# Patient Record
Sex: MALE | Race: BLACK OR AFRICAN AMERICAN | NOT HISPANIC OR LATINO | Employment: OTHER | URBAN - METROPOLITAN AREA
[De-identification: names, ages, dates, MRNs, and addresses within clinical notes are randomized per-mention and may not be internally consistent; named-entity substitution may affect disease eponyms.]

---

## 2017-04-04 ENCOUNTER — HOSPITAL ENCOUNTER (EMERGENCY)
Facility: HOSPITAL | Age: 57
Discharge: HOME | End: 2017-04-04
Attending: EMERGENCY MEDICINE
Payer: MEDICAID

## 2017-04-04 VITALS
RESPIRATION RATE: 16 BRPM | HEIGHT: 65 IN | BODY MASS INDEX: 23.32 KG/M2 | OXYGEN SATURATION: 98 % | TEMPERATURE: 98 F | DIASTOLIC BLOOD PRESSURE: 77 MMHG | HEART RATE: 88 BPM | SYSTOLIC BLOOD PRESSURE: 134 MMHG | WEIGHT: 140 LBS
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
## 2017-04-04 DIAGNOSIS — M25.562 ACUTE PAIN OF LEFT KNEE: Primary | ICD-10-CM

## 2017-04-04 DIAGNOSIS — S70.01XA CONTUSION OF RIGHT HIP, INITIAL ENCOUNTER: ICD-10-CM

## 2017-04-04 DIAGNOSIS — S09.90XA HEAD INJURY: ICD-10-CM

## 2017-04-04 PROCEDURE — 99284 EMERGENCY DEPT VISIT MOD MDM: CPT

## 2017-04-04 PROCEDURE — 25000003 PHARM REV CODE 250: Performed by: NURSE PRACTITIONER

## 2017-04-04 RX ORDER — ACETAMINOPHEN 500 MG
500 TABLET ORAL
Status: COMPLETED | OUTPATIENT
Start: 2017-04-04 | End: 2017-04-04

## 2017-04-04 RX ORDER — ACETAMINOPHEN AND CODEINE PHOSPHATE 300; 30 MG/1; MG/1
1 TABLET ORAL EVERY 6 HOURS PRN
Qty: 10 TABLET | Refills: 0 | Status: SHIPPED | OUTPATIENT
Start: 2017-04-04 | End: 2017-04-14

## 2017-04-04 RX ADMIN — ACETAMINOPHEN 500 MG: 500 TABLET ORAL at 02:04

## 2017-04-04 NOTE — ED AVS SNAPSHOT
[facility]  [address]  [place] [address]               Perry Sampson   2017  1:28 PM   ED    Description:  Male : 1960   Department:  [facility]           Your Care was Coordinated By:     Provider Role From To    Julia Almanza MD Attending Provider 17 1350 --    SHANI Fernández Nurse Practitioner 17 1331 --      Reason for Visit     Fall           Diagnoses this Visit        Comments    Acute pain of left knee    -  Primary     Fall         Head injury         Contusion of right hip, initial encounter           ED Disposition     ED Disposition Condition Comment    Discharge             To Do List           Follow-up Information     Follow up with Jonnie Toscano MD. Schedule an appointment as soon as possible for a visit in 3 days.    Specialty:  Family Medicine    Contact information:    [address]   [address]   [place] [address]  [phone]         These Medications        Disp Refills Start End    acetaminophen-codeine 300-30mg (TYLENOL #3) 300-30 mg Tab 10 tablet 0 2017    Take 1 tablet by mouth every 6 (six) hours as needed. NO driving. NO additional tylenol.  May be sedating. - Oral    Pharmacy: [facility]/pharmacy #[id number] - [place] - [address] [facility] Ph #: [phone]         [facility]     [facility] Care [facility] - 24/7 Assistance  Unless otherwise directed by your provider, please contact [facility], our nurse care line that is available for 24/7 assistance.     Registered nurses in the [facility] provide: appointment scheduling, clinical advisement, health education, and other advisory services.  Call: [phone] (toll free)               Medications           Message regarding Medications     Verify the changes and/or additions to your medication regime listed below are the same as discussed with your clinician today.  If any of these  changes or additions are incorrect, please notify your healthcare provider.        START taking these NEW medications        Refills    acetaminophen-codeine 300-30mg (TYLENOL #3) 300-30 mg Tab 0    Sig: Take 1 tablet by mouth every 6 (six) hours as needed. NO driving. NO additional tylenol.  May be sedating.    Class: Print    Route: Oral      These medications were administered today        Dose Freq    acetaminophen tablet 500 mg 500 mg ED 1 Time    Sig: Take 1 tablet (500 mg total) by mouth ED 1 Time.    Class: Normal    Route: Oral           Verify that the below list of medications is an accurate representation of the medications you are currently taking.  If none reported, the list may be blank. If incorrect, please contact your healthcare provider. Carry this list with you in case of emergency.           Current Medications     aspirin (ECOTRIN) 81 MG EC tablet Take 1 tablet (81 mg total) by mouth once daily.    atorvastatin (LIPITOR) 40 MG tablet Take 1 tablet (40 mg total) by mouth once daily.    cyclobenzaprine (FLEXERIL) 10 MG tablet Take 1 tablet (10 mg total) by mouth nightly.    gabapentin (NEURONTIN) 300 MG capsule Take 2 capsules (600 mg total) by mouth 3 (three) times daily.    meloxicam (MOBIC) 15 MG tablet Take 1 tablet (15 mg total) by mouth once daily.    acetaminophen-codeine 300-30mg (TYLENOL #3) 300-30 mg Tab Take 1 tablet by mouth every 6 (six) hours as needed. NO driving. NO additional tylenol.  May be sedating.    naproxen (NAPROSYN) 500 MG tablet Take 1 tablet (500 mg total) by mouth 2 (two) times daily with meals.    nortriptyline (PAMELOR) 25 MG capsule Take 1 capsule (25 mg total) by mouth every evening.    penicillin v potassium (VEETID) 500 MG tablet TAKE 1 TABLET EVRY 6 HOURS. 1 HOUR BEFORE MEAL OR 2 HOURS AFTER MEAL           Clinical Reference Information           Your Vitals Were     BP Pulse Temp Resp Height Weight    134/77 (BP Location: Right arm, Patient Position: Sitting,  "BP Method: Automatic) 88 98.3 °F (36.8 °C) 16 5' 5" (1.651 m) 63.5 kg (140 lb)    SpO2 BMI             98% 23.3 kg/m2         Allergies as of 4/4/2017        Reactions    Pineapple Hives    Tomato (Solanum Lycopersicum) Swelling      Immunizations Administered on Date of Encounter - 4/4/2017     None      ED Micro, Lab, POCT     None      ED Imaging Orders     Start Ordered       Status Ordering Provider    04/04/17 1349 04/04/17 1348  CT Head Without Contrast  1 time imaging      Final result     04/04/17 1348 04/04/17 1347  X-Ray Pelvis Routine AP  1 time imaging      Final result     04/04/17 1347 04/04/17 1347  X-Ray Knee 3 View Left  1 time imaging      Final result     04/04/17 1347 04/04/17 1347  X-Ray Hip 2 View Right  1 time imaging      Final result         Discharge Instructions         Arthralgia    Arthralgia is the term for pain in or around the joint. It is a symptom, not a disease. This pain may involve one or more joints. In some cases, the pain moves from joint to joint.  There are many causes for joint pain. These include:  · Injury  · Osteoarthritis (wearing out of the joint surface)  · Gout (inflammation of the joint due to crystals in the joint fluid)  · Infection inside the joint    · Bursitis (inflammation of the fluid-filled sacs around the joint)  · Autoimmune disorders such as rheumatoid arthritis or lupus  · Tendonitis (inflamation of chords that attach muscle to bone)  Home care  · Rest the involved joint(s) until your symptoms improve.   · You may be prescribed pain medication. If none is prescribed, you may use acetaminophen or ibuprofen to control pain and inflammation.  Follow up  Follow up with your healthcare provider or our staff as advised.  When to seek medical care  Contact your healthcare provider right away if any of the following occurs:  · Pain, swelling, or redness of joint increases  · Pain worsens or recurs after a period of improvement  · Pain moves to other " joints  · You cannot bear weight on the affected joint   · You cannot move the affected joint  · Joint appears deformed  · New rash appears  · Fever of 101ºF (38.8ºC) or higher, or as directed by your healthcare provider  Date Last Reviewed: 4/26/2015 © 2000-2016 [employer]*. [address] [place] [address]. All rights reserved. This information is not intended as a substitute for professional medical care. Always follow your healthcare professional's instructions.          [facility] Sign-Up     Activating your [facility] account is as easy as 1-2-3!     1) Visit [email or web address], select Sign Up Now, enter this activation code and your date of birth, then select Next.  [email or web address]  Expires: 5/19/2017  2:56 PM      2) Create a username and password to use when you visit [facility] in the future and select a security question in case you lose your password and select Next.    3) Enter your e-mail address and click Sign Up!    Additional Information  If you have questions, please e-mail [email or web address] or call [phone] to talk to our [facility] staff. Remember, [facility] is NOT to be used for urgent needs. For medical emergencies, dial 911.         Smoking Cessation     If you would like to quit smoking:   You may be eligible for free services if you are a [place] resident and started smoking cigarettes before September 1, 1988.  Call the Smoking Cessation Trust (SCT) toll free at (306) 876-2717 or (525) 828-6300.   Call 800-QUIT-NOW if you do not meet the above criteria.   Contact us via email: [email or web address]   View our website for more information: [email or web address]         [facility] complies with applicable Federal civil rights laws and does not discriminate on the basis of race, color, national origin, age, disability, or sex.        Language Assistance Services     ATTENTION: Language assistance services are available, free of  charge. Please call [phone].      ATENCIÓN: Si habla español, tiene a green disposición servicios gratuitos de asistencia lingüística. Llame al [phone].     CHÚ Ý: N?u b?n nói Ti?ng Vi?t, có các d?ch v? h? tr? ngôn ng? mi?n phí dành cho b?n. G?i s? [phone].

## 2017-04-04 NOTE — DISCHARGE INSTRUCTIONS
Arthralgia    Arthralgia is the term for pain in or around the joint. It is a symptom, not a disease. This pain may involve one or more joints. In some cases, the pain moves from joint to joint.  There are many causes for joint pain. These include:  · Injury  · Osteoarthritis (wearing out of the joint surface)  · Gout (inflammation of the joint due to crystals in the joint fluid)  · Infection inside the joint    · Bursitis (inflammation of the fluid-filled sacs around the joint)  · Autoimmune disorders such as rheumatoid arthritis or lupus  · Tendonitis (inflamation of chords that attach muscle to bone)  Home care  · Rest the involved joint(s) until your symptoms improve.   · You may be prescribed pain medication. If none is prescribed, you may use acetaminophen or ibuprofen to control pain and inflammation.  Follow up  Follow up with your healthcare provider or our staff as advised.  When to seek medical care  Contact your healthcare provider right away if any of the following occurs:  · Pain, swelling, or redness of joint increases  · Pain worsens or recurs after a period of improvement  · Pain moves to other joints  · You cannot bear weight on the affected joint   · You cannot move the affected joint  · Joint appears deformed  · New rash appears  · Fever of 101ºF (38.8ºC) or higher, or as directed by your healthcare provider  Date Last Reviewed: 4/26/2015  © 5320-6628 [employer]. [address] [place] [address], [place] [address]. All rights reserved. This information is not intended as a substitute for professional medical care. Always follow your healthcare professional's instructions.

## 2017-04-04 NOTE — ED PROVIDER NOTES
"Encounter Date: 4/4/2017       History     Chief Complaint   Patient presents with    Fall     tripped over loose carpet on saturday and fell.  Reports soreness to right hip and left leg.  Gait steady     Review of patient's allergies indicates:   Allergen Reactions    Pineapple Hives    Tomato (solanum lycopersicum) Swelling     HPI Comments: 55yo male here for left knee and right hip pain after tripping over carpet while walking up stairs at a tire repair center on Saturday.  Pt is unsure if he hit is head, but states, "I think I may have passed out."  No visual changes, neck pain, numbness/tingling, weakness.  Pt has left leg weakness and uses a cane.      Patient is a 56 y.o. male presenting with the following complaint: fall. The history is provided by the patient.   Fall   Illness onset: Saturday. The fall occurred while walking. He fell from a height of 1 to 2 ft. He landed on concrete. There was no blood loss. Point of impact: Left knee, right hip.  Pt is unsure if he hit is head,. Pain location: left knee, right hip.  The pain is at a severity of 8/10. He was ambulatory at the scene. There was no entrapment after the fall. There was no drug use involved in the accident. There was no alcohol use involved in the accident. Associated symptoms include headaches ("a little"). Pertinent negatives include no neck pain, no back pain, no paralysis, no visual change, no fever, no numbness, no abdominal pain, no bowel incontinence, no nausea, no vomiting, no hearing loss, no loss of consciousness and no tingling. The symptoms are aggravated by activity. He has tried nothing for the symptoms.     History reviewed. No pertinent past medical history.  Past Surgical History:   Procedure Laterality Date    MANDIBLE FRACTURE SURGERY       History reviewed. No pertinent family history.  Social History   Substance Use Topics    Smoking status: Current Every Day Smoker    Smokeless tobacco: None    Alcohol use No " "    Review of Systems   Constitutional: Negative for activity change, fatigue and fever.   HENT: Negative for congestion.    Eyes: Negative.    Respiratory: Negative for cough, chest tightness and shortness of breath.    Cardiovascular: Negative for chest pain and leg swelling.   Gastrointestinal: Negative for abdominal pain, bowel incontinence, diarrhea, nausea and vomiting.   Endocrine: Negative.    Genitourinary: Negative for difficulty urinating and flank pain.   Musculoskeletal: Positive for arthralgias (left knee, right hip). Negative for back pain, joint swelling, myalgias and neck pain.   Skin: Negative for pallor and wound.   Allergic/Immunologic: Negative for immunocompromised state.   Neurological: Positive for syncope (possible) and headaches ("a little"). Negative for dizziness, tingling, loss of consciousness, weakness and numbness.   Hematological: Does not bruise/bleed easily.   Psychiatric/Behavioral: Negative for confusion.   All other systems reviewed and are negative.      Physical Exam   Initial Vitals   BP Pulse Resp Temp SpO2   04/04/17 1156 04/04/17 1156 04/04/17 1156 04/04/17 1156 04/04/17 1156   124/82 82 16 98.6 °F (37 °C) 99 %     Physical Exam    Nursing note and vitals reviewed.  Constitutional: Vital signs are normal. He appears well-developed and well-nourished. He is active and cooperative. He is easily aroused.  Non-toxic appearance. He does not have a sickly appearance. He does not appear ill. No distress.   HENT:   Head: Normocephalic and atraumatic.   Right Ear: Hearing, tympanic membrane, external ear and ear canal normal. No hemotympanum.   Left Ear: Hearing, tympanic membrane, external ear and ear canal normal. No hemotympanum.   Nose: Nose normal.   Mouth/Throat: Uvula is midline, oropharynx is clear and moist and mucous membranes are normal.   Eyes: Conjunctivae, EOM and lids are normal. Pupils are equal, round, and reactive to light.   Neck: Normal range of motion. " "  Cardiovascular: Normal rate, regular rhythm and normal heart sounds.   Pulses:       Dorsalis pedis pulses are 2+ on the right side, and 2+ on the left side.   Pulmonary/Chest: Effort normal and breath sounds normal.   Abdominal: Soft. Normal appearance and bowel sounds are normal. There is no tenderness.   Musculoskeletal:        Right hip: He exhibits tenderness and bony tenderness. He exhibits normal range of motion, normal strength, no swelling, no crepitus, no deformity and no laceration.        Left hip: Normal.        Right knee: Normal.        Left knee: He exhibits bony tenderness. He exhibits normal range of motion, no swelling, no effusion, no ecchymosis, no deformity, no laceration, no erythema, normal alignment, no LCL laxity, normal patellar mobility, normal meniscus and no MCL laxity. Tenderness found. Medial joint line tenderness noted. No lateral joint line, no MCL, no LCL and no patellar tendon tenderness noted.        Right ankle: Normal.        Left ankle: Normal.        Cervical back: Normal.        Thoracic back: Normal.        Lumbar back: Normal.        Right upper leg: He exhibits tenderness. He exhibits no bony tenderness, no swelling, no edema, no deformity and no laceration.        Left upper leg: Normal.        Right lower leg: Normal.        Left lower leg: Normal.   Pt reports "problems with the left leg" for a long time.  He reports abnormal heel to toe which his PCP is aware of.  He uses a cane for ambulation.  Decreased plantar flexion of left foot.    Neurological: He is alert, oriented to person, place, and time and easily aroused. No sensory deficit. GCS eye subscore is 4. GCS verbal subscore is 5. GCS motor subscore is 6.   Skin: Skin is warm, dry and intact. No rash noted.   Psychiatric: He has a normal mood and affect. His speech is normal and behavior is normal. Judgment and thought content normal. Cognition and memory are normal.         ED Course   Procedures  Labs " Reviewed - No data to display           Imaging Results         X-Ray Hip 2 View Right (Final result) Result time:  04/04/17 14:47:04    Final result by Sal Soto MD (04/04/17 14:47:04)    Impression:     As above. If there is clinical concern for occult nondisplaced hip fracture, MRI is advised.      Electronically signed by: SAL SOTO MD  Date:     04/04/17  Time:    14:47     Narrative:    Radiographs of pelvis and right hip, 3 images    Comparison: None.    Findings: No evidence for pelvic or right hip fracture or dislocation.  Cartilage space is maintained with moderate osteophyte production.  No lytic or blastic lesions.  Degenerative changes are seen in the lower lumbar spine.            X-Ray Knee 3 View Left (Final result) Result time:  04/04/17 14:47:46    Final result by Sal Soto MD (04/04/17 14:47:46)    Impression:     Unremarkable examination.      Electronically signed by: SAL SOTO MD  Date:     04/04/17  Time:    14:47     Narrative:    Left knee radiographs, 3 views    Comparison: 1/26/16    Findings: No acute fracture or dislocation.  No joint effusion.  Cartilage spaces are maintained on nonweightbearing views.            X-Ray Pelvis Routine AP (Final result) Result time:  04/04/17 14:47:03    Final result by Sal Soto MD (04/04/17 14:47:03)    Impression:     As above. If there is clinical concern for occult nondisplaced hip fracture, MRI is advised.      Electronically signed by: SAL SOTO MD  Date:     04/04/17  Time:    14:47     Narrative:    Radiographs of pelvis and right hip, 3 images    Comparison: None.    Findings: No evidence for pelvic or right hip fracture or dislocation.  Cartilage space is maintained with moderate osteophyte production.  No lytic or blastic lesions.  Degenerative changes are seen in the lower lumbar spine.            CT Head Without Contrast (Final result) Result time:  04/04/17 14:28:17    Final result by Sal Soto MD (04/04/17  "14:28:17)    Impression:     Unremarkable examination.      Electronically signed by: SAL VELIZ MD  Date:     04/04/17  Time:    14:28     Narrative:    Head CT    Technique: CT scan of the head was performed without contrast utilizing 5-mm contiguous axial sections.    Comparison: 11/12/15    Findings:   There is no intracranial hemorrhage, fluid collection, or mass effect.  Gray-white differentiation is preserved.  Limited evaluation of paranasal sinuses, mastoid air cells, and calvarium is unremarkable.              Medical Decision Making:   Initial Assessment:   57yo male here for left knee and right hip pain since ground-level fall on Saturday with possible LOC.  Pt appears well, walked in to the ED with assistance of his cane.  PERRL, EOM intact bilaterally. Ears normal.  Neck normal without bony or paraspinal tenderness.  Abd soft, non-tender.  Left medial joint line pain of left knee.  Full AROM left knee, painful extension.  No laxity.  Right hip pain and TTP over greater trochanter.  Stable pelvis.  Pt reports chronic left leg "decreased heel to toe" for several years.    Differential Diagnosis:   Strain, sprain, fracture, contusion  Clinical Tests:   Radiological Study: Ordered and Reviewed  ED Management:  Xrays, PO tylenol, CT head  CT negative for acute change. Xrays negative for fracture.  Pt has contusion by history.  Pt reports improvement of pain after PO tylenol.  RX tylenol 3.  Advised no driving or operating heavy machinery with Rx.  F/u with PCP within 3 days. Pt verbalized understanding and compliance.               Attending Attestation:     Physician Attestation Statement for NP/PA:   I discussed this assessment and plan of this patient with the NP/PA, but I did not personally examine the patient. The face to face encounter was performed by the NP/PA.                  ED Course     Clinical Impression:   The primary encounter diagnosis was Acute pain of left knee. Diagnoses of Fall, " Head injury, and Contusion of right hip, initial encounter were also pertinent to this visit.    Disposition:   Disposition: Discharged  Condition: Stable       SHANI Fernández  04/04/17 1810       Julia Almanza MD  04/05/17 1120

## 2017-04-04 NOTE — ED NOTES
Pt states he tripped on rug outside of store, landing on L knee, R hip.  Pt states possible hitting head and possible LOC. Pt denies HA

## 2017-05-01 DIAGNOSIS — M62.838 MUSCLE SPASM OF BOTH LOWER LEGS: ICD-10-CM

## 2017-05-05 DIAGNOSIS — M62.838 MUSCLE SPASM OF BOTH LOWER LEGS: ICD-10-CM

## 2017-05-08 RX ORDER — CYCLOBENZAPRINE HCL 10 MG
TABLET ORAL
Qty: 60 TABLET | Refills: 0 | OUTPATIENT
Start: 2017-05-08

## 2017-05-10 RX ORDER — CYCLOBENZAPRINE HCL 10 MG
10 TABLET ORAL NIGHTLY
Qty: 60 TABLET | Refills: 1 | Status: SHIPPED | OUTPATIENT
Start: 2017-05-10 | End: 2018-08-30

## 2017-10-08 DIAGNOSIS — M62.838 MUSCLE SPASM OF BOTH LOWER LEGS: ICD-10-CM

## 2017-10-08 RX ORDER — CYCLOBENZAPRINE HCL 10 MG
10 TABLET ORAL NIGHTLY
Qty: 60 TABLET | Refills: 1 | OUTPATIENT
Start: 2017-10-08

## 2017-10-30 DIAGNOSIS — M62.838 MUSCLE SPASM OF BOTH LOWER LEGS: ICD-10-CM

## 2017-10-30 RX ORDER — CYCLOBENZAPRINE HCL 10 MG
10 TABLET ORAL NIGHTLY
Qty: 60 TABLET | Refills: 1 | OUTPATIENT
Start: 2017-10-30

## 2018-08-30 ENCOUNTER — HOSPITAL ENCOUNTER (EMERGENCY)
Facility: HOSPITAL | Age: 58
Discharge: HOME | End: 2018-08-30
Attending: EMERGENCY MEDICINE
Payer: MEDICAID

## 2018-08-30 VITALS
BODY MASS INDEX: 22.5 KG/M2 | TEMPERATURE: 98 F | HEIGHT: 66 IN | RESPIRATION RATE: 18 BRPM | OXYGEN SATURATION: 97 % | DIASTOLIC BLOOD PRESSURE: 74 MMHG | HEART RATE: 56 BPM | WEIGHT: 140 LBS | SYSTOLIC BLOOD PRESSURE: 140 MMHG

## 2018-08-30 DIAGNOSIS — M54.12 CERVICAL RADICULOPATHY: Primary | ICD-10-CM

## 2018-08-30 PROCEDURE — 99284 EMERGENCY DEPT VISIT MOD MDM: CPT | Mod: 25

## 2018-08-30 RX ORDER — METHOCARBAMOL 500 MG/1
1000 TABLET, FILM COATED ORAL 3 TIMES DAILY
Qty: 30 TABLET | Refills: 0 | Status: SHIPPED | OUTPATIENT
Start: 2018-08-30 | End: 2018-09-04

## 2018-08-30 RX ORDER — IBUPROFEN 200 MG
600 TABLET ORAL EVERY 6 HOURS PRN
Qty: 20 TABLET | Refills: 0 | Status: SHIPPED | OUTPATIENT
Start: 2018-08-30 | End: 2022-07-22

## 2018-08-30 RX ORDER — IBUPROFEN 200 MG
200 TABLET ORAL EVERY 6 HOURS PRN
COMMUNITY
End: 2018-08-30 | Stop reason: SDUPTHER
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
## 2018-08-30 NOTE — DISCHARGE INSTRUCTIONS
Please take prescribed medication as labeled as needed for pain. Do not drive, drink alcohol, or operate machinery while taking  Robaxin.  Follow up with PCP within 2-3 days and return to ED if symptoms worsen or change.

## 2022-07-22 ENCOUNTER — OFFICE VISIT (OUTPATIENT)
Dept: FAMILY MEDICINE | Facility: HOSPITAL | Age: 62
End: 2022-07-22
Attending: FAMILY MEDICINE
Payer: MEDICAID

## 2022-07-22 ENCOUNTER — LAB VISIT (OUTPATIENT)
Dept: LAB | Facility: HOSPITAL | Age: 62
End: 2022-07-22
Attending: FAMILY MEDICINE
Payer: MEDICAID

## 2022-07-22 VITALS
BODY MASS INDEX: 19.77 KG/M2 | WEIGHT: 123 LBS | HEIGHT: 66 IN | SYSTOLIC BLOOD PRESSURE: 125 MMHG | HEART RATE: 69 BPM | DIASTOLIC BLOOD PRESSURE: 70 MMHG

## 2022-07-22 DIAGNOSIS — N52.9 ERECTILE DYSFUNCTION, UNSPECIFIED ERECTILE DYSFUNCTION TYPE: Primary | ICD-10-CM

## 2022-07-22 DIAGNOSIS — N52.9 ERECTILE DYSFUNCTION, UNSPECIFIED ERECTILE DYSFUNCTION TYPE: ICD-10-CM

## 2022-07-22 DIAGNOSIS — R26.2 DIFFICULTY WALKING: ICD-10-CM

## 2022-07-22 LAB
ALBUMIN SERPL BCP-MCNC: 3.8 G/DL (ref 3.5–5.2)
ALP SERPL-CCNC: 56 U/L (ref 55–135)
ALT SERPL W/O P-5'-P-CCNC: 14 U/L (ref 10–44)
ANION GAP SERPL CALC-SCNC: 11 MMOL/L (ref 8–16)
AST SERPL-CCNC: 15 U/L (ref 10–40)
BASOPHILS # BLD AUTO: 0.05 K/UL (ref 0–0.2)
BASOPHILS NFR BLD: 1.3 % (ref 0–1.9)
BILIRUB SERPL-MCNC: 0.6 MG/DL (ref 0.1–1)
BUN SERPL-MCNC: 11 MG/DL (ref 8–23)
CALCIUM SERPL-MCNC: 9.6 MG/DL (ref 8.7–10.5)
CHLORIDE SERPL-SCNC: 106 MMOL/L (ref 95–110)
CHOLEST SERPL-MCNC: 191 MG/DL (ref 120–199)
CHOLEST/HDLC SERPL: 2.9 {RATIO} (ref 2–5)
CO2 SERPL-SCNC: 27 MMOL/L (ref 23–29)
CREAT SERPL-MCNC: 1.2 MG/DL (ref 0.5–1.4)
DIFFERENTIAL METHOD: ABNORMAL
EOSINOPHIL # BLD AUTO: 0.1 K/UL (ref 0–0.5)
EOSINOPHIL NFR BLD: 1.5 % (ref 0–8)
ERYTHROCYTE [DISTWIDTH] IN BLOOD BY AUTOMATED COUNT: 14 % (ref 11.5–14.5)
EST. GFR  (AFRICAN AMERICAN): >60 ML/MIN/1.73 M^2
EST. GFR  (NON AFRICAN AMERICAN): >60 ML/MIN/1.73 M^2
ESTIMATED AVG GLUCOSE: 128 MG/DL (ref 68–131)
GLUCOSE SERPL-MCNC: 82 MG/DL (ref 70–110)
HBA1C MFR BLD: 6.1 % (ref 4–5.6)
HCT VFR BLD AUTO: 46.8 % (ref 40–54)
HDLC SERPL-MCNC: 66 MG/DL (ref 40–75)
HDLC SERPL: 34.6 % (ref 20–50)
HGB BLD-MCNC: 15.2 G/DL (ref 14–18)
IMM GRANULOCYTES # BLD AUTO: 0.03 K/UL (ref 0–0.04)
IMM GRANULOCYTES NFR BLD AUTO: 0.8 % (ref 0–0.5)
LDLC SERPL CALC-MCNC: 115.8 MG/DL (ref 63–159)
LYMPHOCYTES # BLD AUTO: 1.3 K/UL (ref 1–4.8)
LYMPHOCYTES NFR BLD: 33.9 % (ref 18–48)
MCH RBC QN AUTO: 29.2 PG (ref 27–31)
MCHC RBC AUTO-ENTMCNC: 32.5 G/DL (ref 32–36)
MCV RBC AUTO: 90 FL (ref 82–98)
MONOCYTES # BLD AUTO: 0.4 K/UL (ref 0.3–1)
MONOCYTES NFR BLD: 8.9 % (ref 4–15)
NEUTROPHILS # BLD AUTO: 2.1 K/UL (ref 1.8–7.7)
NEUTROPHILS NFR BLD: 53.6 % (ref 38–73)
NONHDLC SERPL-MCNC: 125 MG/DL
NRBC BLD-RTO: 0 /100 WBC
PLATELET # BLD AUTO: 226 K/UL (ref 150–450)
PMV BLD AUTO: 10.4 FL (ref 9.2–12.9)
POTASSIUM SERPL-SCNC: 4.4 MMOL/L (ref 3.5–5.1)
PROT SERPL-MCNC: 7.2 G/DL (ref 6–8.4)
RBC # BLD AUTO: 5.21 M/UL (ref 4.6–6.2)
SODIUM SERPL-SCNC: 144 MMOL/L (ref 136–145)
TRIGL SERPL-MCNC: 46 MG/DL (ref 30–150)
WBC # BLD AUTO: 3.95 K/UL (ref 3.9–12.7)

## 2022-07-22 PROCEDURE — 80053 COMPREHEN METABOLIC PANEL: CPT

## 2022-07-22 PROCEDURE — 36415 COLL VENOUS BLD VENIPUNCTURE: CPT

## 2022-07-22 PROCEDURE — 80061 LIPID PANEL: CPT

## 2022-07-22 PROCEDURE — 85025 COMPLETE CBC W/AUTO DIFF WBC: CPT

## 2022-07-22 PROCEDURE — 83036 HEMOGLOBIN GLYCOSYLATED A1C: CPT

## 2022-07-22 PROCEDURE — 99203 OFFICE O/P NEW LOW 30 MIN: CPT

## 2022-07-22 RX ORDER — TRAMADOL HYDROCHLORIDE 50 MG/1
50 TABLET ORAL
COMMUNITY
Start: 2022-06-22

## 2022-07-22 NOTE — PROGRESS NOTES
"    History & Physical  [facility]      SUBJECTIVE:     History of Present Illness:  Patient is a 61 y.o. male presents to clinic to establish care. Patient endorses trouble walking and erectile dysfunction. He reports history of stroke in 2008. Since then, states having trouble walking with motor deficits in the left lower extremity and sensory deficits in the right lower extremity. Patient walks with a cane and asked for a prescription for a portable scooter. Patient describes erectile dysfunction as trouble getting an erection with a decrease in morning erections with onset in 2008. States Viagra he got from a friend did not work. Denies penile rash and discharge, hematuria, change in frequency, dysuria, fever, chills, night sweats. Patient receives Tramodol for pain from "Dr. Golden."    Immunizations:  Flu - denies  Tdap - unknown  Pneumovax - denies  Zoster - denies    Screenings:  Last colonoscopy - never got one, denies  Last prostate exam - never got one, denies  Last HgbA1C - unknown   Last STI testing - unknown  Last lipid panel - unknown  Last eye exam - 5 months ago    Social History:  Smoker -  1/2 ppd for 30 yrs, Reports quit 7 days ago  EtOH use - denies use  Drug use - marijuana, every day  Diet - "whatever whenever" and eats 1-2 meals per day  Exercise - denies  Weight - "40 lbs over 7 years"  Sleep - "pretty good sleep"  Sexual history - sexual active with women    Review of patient's allergies indicates:   Allergen Reactions    Pineapple Hives    Tomato (solanum lycopersicum) Swelling       No past medical history on file.  Past Surgical History:   Procedure Laterality Date    MANDIBLE FRACTURE SURGERY       No family history on file.  Social History     Tobacco Use    Smoking status: Current Every Day Smoker   Substance Use Topics    Alcohol use: No    Drug use: No        OBJECTIVE:     Vital Signs (Most Recent)  Vitals:    07/22/22 1047   BP: 125/70   Pulse: 69   Weight: 55.8 kg " "(123 lb 0.3 oz)   Height: 5' 6" (1.676 m)     BMI: 19.86    Physical Exam:  Physical Exam  Constitutional:       Appearance: Normal appearance.   HENT:      Head: Normocephalic.      Mouth/Throat:      Mouth: Mucous membranes are moist.      Pharynx: Oropharynx is clear.   Eyes:      Extraocular Movements: Extraocular movements intact.      Pupils: Pupils are equal, round, and reactive to light.   Cardiovascular:      Rate and Rhythm: Normal rate and regular rhythm.      Pulses: Normal pulses.      Heart sounds: Normal heart sounds.   Pulmonary:      Effort: Pulmonary effort is normal.      Breath sounds: Normal breath sounds.   Abdominal:      General: Abdomen is flat.      Palpations: Abdomen is soft.   Musculoskeletal:      Cervical back: Normal range of motion.      Comments: Decreased range of motion in left shoulder  Decreased ability to close left hand  Strength in tact in upper extremities bilaterally  Range of motion in tact in lower extremities bilaterally  Strength decreased in lower left extremity   Skin:     General: Skin is warm.   Neurological:      Mental Status: He is alert and oriented to person, place, and time.      Gait: Gait abnormal.      Comments: Decreased sensation to the right lower extremity from the hip down to the foot   Psychiatric:         Mood and Affect: Mood normal.         Behavior: Behavior normal.            ASSESSMENT/PLAN:   61 y.o.male presents to clinic to establish care. Patient endorses trouble walking and erectile dysfunction.    1. Trouble Walking  - Patient referred to PT/OT for further evaluation and muscle strengthening.        2. Erectile Dysfunction  - Patient will have lab work (CBC, CMP, Lipids, Hgb A1C) done and will be managed after the results return.      Follow-up: in 2-3 weeks to go over labs      Benjamin Espinoza MD  [facility] PGY-1  @date@      "

## 2022-07-28 NOTE — PROGRESS NOTES
I assume primary medical responsibility for this patient. I have reviewed the history, physical, and assessement & treatment plan with the resident and agree that the care is reasonable and necessary. This service has been performed by a resident without the presence of a teaching physician under the primary care exception. If necessary, an addendum of additional findings or evaluation beyond the resident documentation will be noted below.     Irma Woods MD

## 2022-08-01 ENCOUNTER — CLINICAL SUPPORT (OUTPATIENT)
Dept: REHABILITATION | Facility: HOSPITAL | Age: 62
End: 2022-08-01
Payer: MEDICAID

## 2022-08-01 DIAGNOSIS — Z91.81 RISK FOR FALLS: ICD-10-CM

## 2022-08-01 DIAGNOSIS — R26.2 DIFFICULTY WALKING: ICD-10-CM

## 2022-08-01 DIAGNOSIS — R26.9 GAIT ABNORMALITY: ICD-10-CM

## 2022-08-01 DIAGNOSIS — R53.1 LEFT-SIDED WEAKNESS: ICD-10-CM

## 2022-08-01 PROCEDURE — 97162 PT EVAL MOD COMPLEX 30 MIN: CPT | Mod: PN

## 2022-08-02 PROBLEM — R26.9 GAIT ABNORMALITY: Status: ACTIVE | Noted: 2022-08-02

## 2022-08-02 PROBLEM — R53.1 LEFT-SIDED WEAKNESS: Status: ACTIVE | Noted: 2022-08-02

## 2022-08-02 PROBLEM — Z91.81 RISK FOR FALLS: Status: ACTIVE | Noted: 2022-08-02
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
## 2022-08-16 ENCOUNTER — TELEPHONE (OUTPATIENT)
Dept: REHABILITATION | Facility: HOSPITAL | Age: 62
End: 2022-08-16
Payer: MEDICAID

## 2022-08-22 ENCOUNTER — OFFICE VISIT (OUTPATIENT)
Dept: FAMILY MEDICINE | Facility: HOSPITAL | Age: 62
End: 2022-08-22
Attending: STUDENT IN AN ORGANIZED HEALTH CARE EDUCATION/TRAINING PROGRAM
Payer: MEDICAID

## 2022-08-22 VITALS
HEIGHT: 66 IN | SYSTOLIC BLOOD PRESSURE: 98 MMHG | HEART RATE: 68 BPM | BODY MASS INDEX: 20.16 KG/M2 | WEIGHT: 125.44 LBS | DIASTOLIC BLOOD PRESSURE: 61 MMHG

## 2022-08-22 DIAGNOSIS — N52.9 ERECTILE DYSFUNCTION, UNSPECIFIED ERECTILE DYSFUNCTION TYPE: Primary | ICD-10-CM

## 2022-08-22 DIAGNOSIS — E78.00 HYPERCHOLESTEREMIA: ICD-10-CM

## 2022-08-22 DIAGNOSIS — Z71.9 HEALTH EDUCATION/COUNSELING: ICD-10-CM

## 2022-08-22 PROCEDURE — 3078F DIAST BP <80 MM HG: CPT | Mod: ,,,

## 2022-08-22 PROCEDURE — 3008F PR BODY MASS INDEX (BMI) DOCUMENTED: ICD-10-PCS | Mod: ,,,

## 2022-08-22 PROCEDURE — 3078F PR MOST RECENT DIASTOLIC BLOOD PRESSURE < 80 MM HG: ICD-10-PCS | Mod: ,,,

## 2022-08-22 PROCEDURE — 1160F PR REVIEW ALL MEDS BY PRESCRIBER/CLIN PHARMACIST DOCUMENTED: ICD-10-PCS | Mod: ,,,

## 2022-08-22 PROCEDURE — 3008F BODY MASS INDEX DOCD: CPT | Mod: ,,,

## 2022-08-22 PROCEDURE — 1160F RVW MEDS BY RX/DR IN RCRD: CPT | Mod: ,,,

## 2022-08-22 PROCEDURE — 3044F HG A1C LEVEL LT 7.0%: CPT | Mod: ,,,

## 2022-08-22 PROCEDURE — 1159F MED LIST DOCD IN RCRD: CPT | Mod: ,,,

## 2022-08-22 PROCEDURE — 99213 OFFICE O/P EST LOW 20 MIN: CPT

## 2022-08-22 PROCEDURE — 1159F PR MEDICATION LIST DOCUMENTED IN MEDICAL RECORD: ICD-10-PCS | Mod: ,,,

## 2022-08-22 PROCEDURE — 3074F SYST BP LT 130 MM HG: CPT | Mod: ,,,

## 2022-08-22 PROCEDURE — 99213 OFFICE O/P EST LOW 20 MIN: CPT | Mod: GC,,,

## 2022-08-22 PROCEDURE — 99213 PR OFFICE/OUTPT VISIT, EST, LEVL III, 20-29 MIN: ICD-10-PCS | Mod: GC,,,

## 2022-08-22 PROCEDURE — 3074F PR MOST RECENT SYSTOLIC BLOOD PRESSURE < 130 MM HG: ICD-10-PCS | Mod: ,,,

## 2022-08-22 PROCEDURE — 3044F PR MOST RECENT HEMOGLOBIN A1C LEVEL <7.0%: ICD-10-PCS | Mod: ,,,

## 2022-08-22 RX ORDER — SILDENAFIL 50 MG/1
50 TABLET, FILM COATED ORAL DAILY PRN
Qty: 30 TABLET | Refills: 1 | Status: SHIPPED | OUTPATIENT
Start: 2022-08-22 | End: 2022-08-26 | Stop reason: SDUPTHER

## 2022-08-22 RX ORDER — ATORVASTATIN CALCIUM 40 MG/1
40 TABLET, FILM COATED ORAL DAILY
Qty: 30 TABLET | Refills: 2 | Status: SHIPPED | OUTPATIENT
Start: 2022-08-22 | End: 2024-02-08 | Stop reason: SDUPTHER

## 2022-08-22 NOTE — PROGRESS NOTES
"  History & Physical  [facility]      SUBJECTIVE:     History of Present Illness:  Patient is a 61 y.o. male presents to clinic to review labs evaluation for erectile dysfunction. Reviewed labs since previous visit. CBC, CMP within normal limits.  Lipid panel significant for total cholesterol 191, HDL 66, .8. Hgb A1C 6.1, notable for prediabetes. Counseled patient on diet, exercise, and other lifestyle changes at this time.  Patient states having hard time achieving and maintaining an erection.  Reports decreased morning erections.  Onset in  around the same time as his possible stroke.  Patient reports no decreased interest sexual activity.  Patient states he tried Viagra 100 mg from a friend in achieve erection but complained of blurred vision for about 30 minutes.    Social History:  Smoker - 1/2 ppd for 30 years. Reports quitting 1 month ago.  EtOH use - never  Drug use - Marijuana daily  Diet - "whatever whenever" eating 1-2 meals per day  Exercise - denies  Weight - has been stable  Sleep - sleeping well  Sexual history - has sex with females    Review of patient's allergies indicates:   Allergen Reactions    Pineapple Hives and Itching    Tomato (solanum lycopersicum) Swelling       No past medical history on file.    Current Outpatient Medications   Medication Instructions    atorvastatin (LIPITOR) 40 mg, Oral, Daily    sildenafiL (VIAGRA) 50 mg, Oral, Daily PRN    traMADoL (ULTRAM) 50 mg, Oral, Every 6-8 hours PRN       Past Surgical History:   Procedure Laterality Date    MANDIBLE FRACTURE SURGERY         No family history on file.    Social History     Tobacco Use    Smoking status: Former Smoker     Types: Cigarettes     Quit date: 7/15/2022     Years since quittin.1    Smokeless tobacco: Never Used   Substance Use Topics    Alcohol use: No    Drug use: No        OBJECTIVE:     Vital Signs (Most Recent)  Vitals:    22 1435   BP: 98/61   BP Location: Left arm " "  Patient Position: Sitting   BP Method: Medium (Automatic)   Pulse: 68   Weight: 56.9 kg (125 lb 7.1 oz)   Height: 5' 6" (1.676 m)     BMI: 20.25    Physical Exam:  Physical Exam  Vitals and nursing note reviewed.   Constitutional:       Appearance: Normal appearance.      Comments: Patient appears alert and oriented, laying comfortably in bed, in no acute distress, non-toxic, non ill appearing, hemodynamically stable with vitals within normal limits.    HENT:      Head: Normocephalic.      Right Ear: External ear normal.      Left Ear: External ear normal.      Nose: Nose normal.      Mouth/Throat:      Mouth: Mucous membranes are moist.      Pharynx: Oropharynx is clear.   Eyes:      Extraocular Movements: Extraocular movements intact.      Conjunctiva/sclera: Conjunctivae normal.      Pupils: Pupils are equal, round, and reactive to light.   Cardiovascular:      Rate and Rhythm: Normal rate and regular rhythm.      Pulses: Normal pulses.      Heart sounds: Normal heart sounds.      Comments: No murmurs, rubs, or gallops. Radial pulses 2+ bilaterally.  Pulmonary:      Effort: Pulmonary effort is normal.      Breath sounds: Normal breath sounds.      Comments: No wheezes, rhonchi, or rales. No clinical signs of increased work of breathing, including patient speaking in complete sentences, no accessory muscle use, or tripoding.  Abdominal:      General: Abdomen is flat. Bowel sounds are normal.      Palpations: Abdomen is soft.      Comments: No tenderness, guarding, or rebound.   Genitourinary:     Penis: Normal.       Testes: Normal.      Comments: Testes and penis appear normal on exam with no rashes, redness, or discharge.  No tenderness to palpation.  No fullness or hernia palpated.  Musculoskeletal:         General: Normal range of motion.      Cervical back: Normal range of motion. No rigidity.      Comments: Numbness to right lower extremity present since 2008. Left sided upper and lower extremity weakness " present since 2008. DP and PT pulses 2+ bilaterally.   Skin:     General: Skin is warm.   Neurological:      Mental Status: He is alert and oriented to person, place, and time.   Psychiatric:         Mood and Affect: Mood normal.         Behavior: Behavior normal.          Laboratory  I have reviewed all pertinent lab results since last office visit.      ASSESSMENT/PLAN:   61 y.o.male presents to clinic to review previous labs and evaluation for erectile dysfunction.    Erectile dysfunction, unspecified erectile dysfunction type       -     Patient complaining of erectile dysfunction with onset in 2008. Denies use decreased interest. Reports decreased morning erections.       -     Medical options as well as urology referral discussed with patient. Patient elected to try sidenafil 50mg.       -     sildenafiL (VIAGRA) 50 MG tablet; Take 1 tablet (50 mg total) by mouth daily as needed for Erectile Dysfunction.  Dispense: 30 tablet; Refill: 1    Hypercholesteremia       -     Patient have total cholesterol 191, HDL 66, .8 on most recent labs.  Patient also has possible past history of TIA/stroke.  ASCVD score calculated to be 5.27%.  Will start patient on moderate intensity statin.       -     atorvastatin (LIPITOR) 40 MG tablet; Take 1 tablet (40 mg total) by mouth once daily.  Dispense: 30 tablet; Refill: 2    Health education/counseling       -     Patient lab significant for hemoglobin A1c 6.1.  Patient educated on prediabetes, exercise, diet, and lifestyle changes.  Will follow up with hemoglobin A1c on next visit.       -     Counseled patient on the importance maintaining a healthy diet (including at least one-half of food eaten should be whole fruits and vegetables with the core elements of the other half of food  should include grains, whole grains, dairy, protein, and oils with lower saturated fat, as well as minimizing alcohol use and consumption of foods with added sugar, saturated fat, and  sodium.)        -     Counseled patient on importance of exercising at least 150-300 minutes per week (i.e. at least 30 minutes, 3 days a week) of moderate physical activity.         Follow-up in: 6 months      A total of approximately 45 minutes was spent on patient care during this encounter which included chart review, examining the patient, formulating a treatment plan and documentation.     Complex medical decision making performed due to multiple medical problems addressed during the visit.       Case discussed with Dr. Callahan.    ________________________  Benjamin Espinoza MD  [facility] PGY-1  08/22/2022 2:33 PM

## 2022-08-22 NOTE — PROGRESS NOTES
I have reviewed the notes, assessments, and/or procedures performed by Dr. Espinoza, I concur with her/his documentation of  Adrián. I examined him.  Good pedal pulses. Some decreased sensation to light touch on RLE. Residual weakness LUE. History is confusing. He had left hemiparesis dating back to 2008?  Several year complaint of LBP and RLE numbness. ED. Agree with low dose cialis.

## 2022-08-23 ENCOUNTER — TELEPHONE (OUTPATIENT)
Dept: REHABILITATION | Facility: HOSPITAL | Age: 62
End: 2022-08-23
Payer: MEDICAID

## 2022-08-23 NOTE — TELEPHONE ENCOUNTER
Called patient regarding no show x 2 and that he will be removed from schedule per [facility] Attendance Policy. Given clinic phone number for return call if he has any questions/concerns.    Zaria Bettencourt, PT, DPT

## 2022-08-26 RX ORDER — SILDENAFIL 50 MG/1
50 TABLET, FILM COATED ORAL DAILY PRN
Qty: 30 TABLET | Refills: 1 | Status: SHIPPED | OUTPATIENT
Start: 2022-08-26 | End: 2024-02-08 | Stop reason: SDUPTHER

## 2023-03-19 PROBLEM — Z91.81 RISK FOR FALLS: Status: RESOLVED | Noted: 2022-08-02 | Resolved: 2023-03-19

## 2023-03-19 PROBLEM — R53.1 LEFT-SIDED WEAKNESS: Status: RESOLVED | Noted: 2022-08-02 | Resolved: 2023-03-19

## 2023-03-19 PROBLEM — R26.9 GAIT ABNORMALITY: Status: RESOLVED | Noted: 2022-08-02 | Resolved: 2023-03-19

## 2023-05-30 ENCOUNTER — OFFICE VISIT (OUTPATIENT)
Dept: FAMILY MEDICINE | Facility: HOSPITAL | Age: 63
End: 2023-05-30
Payer: MEDICAID

## 2023-05-30 ENCOUNTER — LAB VISIT (OUTPATIENT)
Dept: LAB | Facility: HOSPITAL | Age: 63
End: 2023-05-30
Payer: MEDICAID

## 2023-05-30 VITALS
BODY MASS INDEX: 21.44 KG/M2 | HEART RATE: 71 BPM | DIASTOLIC BLOOD PRESSURE: 76 MMHG | HEIGHT: 66 IN | WEIGHT: 133.38 LBS | SYSTOLIC BLOOD PRESSURE: 127 MMHG

## 2023-05-30 DIAGNOSIS — R36.1 HEMATOSPERMIA: ICD-10-CM

## 2023-05-30 DIAGNOSIS — R36.1 HEMATOSPERMIA: Primary | ICD-10-CM

## 2023-05-30 LAB
BACTERIA #/AREA URNS HPF: NORMAL /HPF
BILIRUB UR QL STRIP: NEGATIVE
CLARITY UR: CLEAR
COLOR UR: COLORLESS
GLUCOSE UR QL STRIP: NEGATIVE
HGB UR QL STRIP: ABNORMAL
KETONES UR QL STRIP: NEGATIVE
LEUKOCYTE ESTERASE UR QL STRIP: NEGATIVE
MICROSCOPIC COMMENT: NORMAL
NITRITE UR QL STRIP: NEGATIVE
PH UR STRIP: 7 [PH] (ref 5–8)
PROT UR QL STRIP: NEGATIVE
RBC #/AREA URNS HPF: 2 /HPF (ref 0–4)
SP GR UR STRIP: 1 (ref 1–1.03)
URN SPEC COLLECT METH UR: ABNORMAL
UROBILINOGEN UR STRIP-ACNC: NEGATIVE EU/DL

## 2023-05-30 PROCEDURE — 99213 OFFICE O/P EST LOW 20 MIN: CPT | Performed by: STUDENT IN AN ORGANIZED HEALTH CARE EDUCATION/TRAINING PROGRAM

## 2023-05-30 PROCEDURE — 87591 N.GONORRHOEAE DNA AMP PROB: CPT | Performed by: STUDENT IN AN ORGANIZED HEALTH CARE EDUCATION/TRAINING PROGRAM

## 2023-05-30 PROCEDURE — 81000 URINALYSIS NONAUTO W/SCOPE: CPT | Performed by: STUDENT IN AN ORGANIZED HEALTH CARE EDUCATION/TRAINING PROGRAM

## 2023-05-30 NOTE — PROGRESS NOTES
Subjective:      Patient ID: Perry Sampson is a 62 y.o. male.    Chief Complaint: blood in semen    63 yo M w/ PMH of ED, presenting for evaluation of above. Pt reports having 2 episodes of noticing pinkish semen, one time after having intercourse with his wife, and then 2nd time he noticed it after masturbating. He denies any urinary changes, denies dysuria, hematuria, denies increased frequency or urgency. First episode occurred 05/19, and then 5 days later when he masturbated. These are the first 2 episodes. He denies discharge from penis, he denies inguinal swelling, denies perineal pain, denies straining with urination, denies nocturia, or incomplete voiding. He has intercourse seldomly. He denies hx of STD/STI, however he states he has never been tested. Smokes weed for his chronic pains. Denies hx of prostate cancer or  issues. No recent travel. He takes no daily medications currently.     Review of Systems   Constitutional:  Negative for chills and fever.   Respiratory:  Negative for cough and shortness of breath.    Cardiovascular:  Negative for chest pain.   Gastrointestinal:  Negative for abdominal pain, nausea and vomiting.   Genitourinary:  Negative for difficulty urinating, dysuria, flank pain, frequency, genital sores, hematuria, penile discharge, penile pain, scrotal swelling, testicular pain and urgency.   Neurological:  Negative for weakness.    Objective:     Vitals:    05/30/23 1113   BP: 127/76   Pulse: 71     Physical Exam  Vitals and nursing note reviewed.   Constitutional:       General: He is not in acute distress.     Appearance: Normal appearance. He is not ill-appearing.   Eyes:      General:         Right eye: No discharge.         Left eye: No discharge.      Conjunctiva/sclera: Conjunctivae normal.   Cardiovascular:      Rate and Rhythm: Normal rate and regular rhythm.   Pulmonary:      Effort: Pulmonary effort is normal. No respiratory distress.      Breath sounds: Normal breath  sounds. No wheezing.   Abdominal:      Palpations: Abdomen is soft.      Tenderness: There is no abdominal tenderness.   Genitourinary:     Penis: Normal.       Testes: Normal.      Comments: No inguinal swelling or lesions on penis. No tenderness on palpation of testes    Neurological:      Mental Status: He is alert.   Psychiatric:         Behavior: Behavior normal.     Assessment:      1. Hematospermia      Plan:     Hematospermia  -     Urinalysis, Reflex to Urine Culture Urine, Clean Catch; Future; Expected date: 05/30/2023  -     Urinalysis Microscopic; Future; Expected date: 05/30/2023  -     C. trachomatis/N. gonorrhoeae by AMP DNA; Future; Expected date: 05/30/2023  -     Trichomonas vaginalis, RNA, Qual, Urine; Future; Expected date: 05/30/2023    Will f/u results with patient pending workup above. Advised to make f/u appointment pending workup.    Follow up if symptoms worsen or fail to improve.

## 2023-05-31 ENCOUNTER — TELEPHONE (OUTPATIENT)
Dept: FAMILY MEDICINE | Facility: HOSPITAL | Age: 63
End: 2023-05-31
Payer: MEDICAID

## 2023-05-31 LAB
C TRACH DNA SPEC QL NAA+PROBE: NOT DETECTED
N GONORRHOEA DNA SPEC QL NAA+PROBE: NOT DETECTED

## 2023-05-31 NOTE — TELEPHONE ENCOUNTER
Called patient 2x at number below. No answer, LVM for patient to call back tomorrow, or will call patient tomorrow.    ----- Message from Lenore Scanlon LPN sent at 5/31/2023  4:48 PM CDT -----    ----- Message -----  From: Katerina Cardozo  Sent: 5/31/2023   1:33 PM CDT  To: Olga Alexander Staff    Type:  Test Results    Who Called:  Urine  Name of Test (Lab/Mammo/Etc):  Urine  Date of Test:  5/30/23  Ordering Provider:  Arthur Meadows  Where the test was performed:  [facility]  Would the patient rather a call back or a response via [facility]?  call  Best Call Back Number:  [phone]  Additional Information:

## 2023-06-01 ENCOUNTER — TELEPHONE (OUTPATIENT)
Dept: FAMILY MEDICINE | Facility: HOSPITAL | Age: 63
End: 2023-06-01
Payer: MEDICAID

## 2023-06-01 NOTE — TELEPHONE ENCOUNTER
Called patient to review results from last visit. He endorses 1 more episode of pink tinged ejaculatory fluid. Advised since no signs of infection, we can wait and monitor for another 2-3 weeks. If it persists thereafter, then he would likely need further workup. Pt understands and will call back in 2-3 weeks if he continues to have same issues, and will make f/u appointment or call and notify.    ----- Message from Lenore Scanlon LPN sent at 6/1/2023 10:47 AM CDT -----  Regarding: FW: results for lab work    ----- Message -----  From: Fanny Stein  Sent: 6/1/2023   9:51 AM CDT  To: Bakari Meadows Staff, Olga Alexander Staff  Subject: results for lab work                             Pt called in for results for his lab work...Alphonso back # [phone]

## 2023-06-01 NOTE — TELEPHONE ENCOUNTER
----- Message from Fanny Stein sent at 6/1/2023  9:50 AM CDT -----  Regarding: results for lab work  Pt called in for results for his lab work...Alphonso back # [phone]

## 2023-06-22 ENCOUNTER — TELEPHONE (OUTPATIENT)
Dept: ORTHOPEDICS | Facility: CLINIC | Age: 63
End: 2023-06-22
Payer: MEDICAID

## 2023-06-22 NOTE — TELEPHONE ENCOUNTER
Called patient to f/u symptoms from clinic visit in 05/30. He reports hematospermia had resolved, it resolved after phone call on 06/01. He has had multiple ejaculations after 06/01 and denies any more pink tinge or blood in semen. Discussed with patient if symptoms return, he should RTC for re-evaluation again. All questions answered, he has no concerns or needs at this time.

## 2024-02-08 ENCOUNTER — OFFICE VISIT (OUTPATIENT)
Dept: FAMILY MEDICINE | Facility: HOSPITAL | Age: 64
End: 2024-02-08
Payer: MEDICARE

## 2024-02-08 VITALS
DIASTOLIC BLOOD PRESSURE: 71 MMHG | BODY MASS INDEX: 21.76 KG/M2 | WEIGHT: 135.38 LBS | HEART RATE: 79 BPM | HEIGHT: 66 IN | SYSTOLIC BLOOD PRESSURE: 121 MMHG

## 2024-02-08 DIAGNOSIS — Z11.59 NEED FOR HEPATITIS C SCREENING TEST: ICD-10-CM

## 2024-02-08 DIAGNOSIS — M79.604 BILATERAL LEG PAIN: ICD-10-CM

## 2024-02-08 DIAGNOSIS — M79.605 BILATERAL LEG PAIN: ICD-10-CM

## 2024-02-08 DIAGNOSIS — M79.2 NEUROPATHIC PAIN OF LOWER EXTREMITY, UNSPECIFIED LATERALITY: ICD-10-CM

## 2024-02-08 DIAGNOSIS — E78.5 HYPERLIPIDEMIA, UNSPECIFIED HYPERLIPIDEMIA TYPE: Primary | ICD-10-CM

## 2024-02-08 DIAGNOSIS — Z12.11 SCREENING FOR MALIGNANT NEOPLASM OF COLON: ICD-10-CM

## 2024-02-08 DIAGNOSIS — Z13.6 SCREENING FOR AAA (AORTIC ABDOMINAL ANEURYSM): ICD-10-CM

## 2024-02-08 DIAGNOSIS — M62.838 MUSCLE SPASM OF BOTH LOWER LEGS: ICD-10-CM

## 2024-02-08 DIAGNOSIS — N52.9 ERECTILE DYSFUNCTION, UNSPECIFIED ERECTILE DYSFUNCTION TYPE: ICD-10-CM

## 2024-02-08 PROCEDURE — 99214 OFFICE O/P EST MOD 30 MIN: CPT

## 2024-02-08 RX ORDER — SILDENAFIL 50 MG/1
50 TABLET, FILM COATED ORAL DAILY PRN
Qty: 30 TABLET | Refills: 1 | Status: SHIPPED | OUTPATIENT
Start: 2024-02-08 | End: 2024-04-24 | Stop reason: SDUPTHER

## 2024-02-08 RX ORDER — ATORVASTATIN CALCIUM 40 MG/1
40 TABLET, FILM COATED ORAL DAILY
Qty: 30 TABLET | Refills: 2 | Status: SHIPPED | OUTPATIENT
Start: 2024-02-08 | End: 2024-04-24 | Stop reason: SDUPTHER

## 2024-02-08 RX ORDER — ACETAMINOPHEN 500 MG
1000 TABLET ORAL EVERY 8 HOURS PRN
Qty: 30 TABLET | Refills: 0 | Status: SHIPPED | OUTPATIENT
Start: 2024-02-08 | End: 2024-05-08

## 2024-02-08 RX ORDER — DICLOFENAC SODIUM 30 MG/G
GEL TOPICAL DAILY
Qty: 100 G | Refills: 0 | Status: SHIPPED | OUTPATIENT
Start: 2024-02-08 | End: 2024-04-24

## 2024-02-08 NOTE — PROGRESS NOTES
"  [facility] FAMILY PRACTICE CLINIC NOTE  Follow-up Visit      SUBJECTIVE:     Patient: Perry Sampson is a 63 y.o. male.    Chief Compliant:   Chief Complaint   Patient presents with    Erectile Dysfunction       History of Present Illness:  Erectile dysfunction - reports medication never picked up medication from previous work-up (please see previous notes for work-up). Request new prescription and GoodRx card.    Pain/mobility issues - reports continued pain in right leg. Describes burning shooting pain. Comes and goes. History of bilateral lower extremity weakness and pain. Referred to PT in past but did not attend. Reports will try to go to gym. States symptoms not better or worse than previously.      Review of Systems   Constitutional:  Negative for chills and fever.   HENT:  Negative for sore throat.    Eyes:  Negative for blurred vision.   Respiratory:  Negative for cough and shortness of breath.    Cardiovascular:  Negative for chest pain and palpitations.   Gastrointestinal:  Negative for abdominal pain, constipation, diarrhea, nausea and vomiting.   Genitourinary:  Negative for dysuria.   Musculoskeletal:  Positive for joint pain and myalgias.   Skin:  Negative for rash.   Neurological:  Negative for weakness and headaches.     A 10+ review of systems was performed with pertinent positives and negatives noted above in the history of present illness. Other systems were negative unless otherwise specified.    OBJECTIVE:     Vital Signs (Most Recent)  Vitals:    02/08/24 1445   BP: 121/71   Pulse: 79   Weight: 61.4 kg (135 lb 5.8 oz)   Height: 5' 6" (1.676 m)     BMI: Body mass index is 21.85 kg/m².     Physical Exam:  Physical Exam  Vitals and nursing note reviewed.   Constitutional:       General: He is not in acute distress.     Appearance: Normal appearance. He is normal weight. He is not ill-appearing, toxic-appearing or diaphoretic.   HENT:      Head: Normocephalic.      Right Ear: External ear normal.      " Left Ear: External ear normal.      Nose: Nose normal.      Mouth/Throat:      Pharynx: Oropharynx is clear.   Eyes:      Extraocular Movements: Extraocular movements intact.   Cardiovascular:      Rate and Rhythm: Normal rate and regular rhythm.      Pulses: Normal pulses.      Heart sounds: Normal heart sounds.   Pulmonary:      Effort: Pulmonary effort is normal. No respiratory distress.      Breath sounds: Normal breath sounds. No wheezing, rhonchi or rales.   Abdominal:      General: Abdomen is flat. There is no distension.      Palpations: Abdomen is soft.      Tenderness: There is no abdominal tenderness. There is no guarding or rebound.   Musculoskeletal:      Cervical back: Normal range of motion.      Comments: Walks with cane.  Strength of lower leg extension and flexion 4/5 bilaterally. Upper leg strength 2/5 left and 4/5 right. Pain not reproducible on exam.   Skin:     General: Skin is warm.   Neurological:      General: No focal deficit present.      Mental Status: He is alert and oriented to person, place, and time. Mental status is at baseline.   Psychiatric:         Mood and Affect: Mood normal.         Behavior: Behavior normal.         Thought Content: Thought content normal.          ASSESSMENT:   Perry Sampson is a 63 y.o. male who presents to clinic to for    1. Hyperlipidemia, unspecified hyperlipidemia type    2. Erectile dysfunction, unspecified erectile dysfunction type    3. Muscle spasm of both lower legs    4. Neuropathic pain of lower extremity, unspecified laterality    5. Bilateral leg pain    6. Screening for malignant neoplasm of colon    7. Need for hepatitis C screening test    8. Screening for AAA (aortic abdominal aneurysm)         PLAN:     Hyperlipidemia, unspecified hyperlipidemia type  - Chronic. Controlled. Refills as below. Labs as below. Follow up results.  -     atorvastatin (LIPITOR) 40 MG tablet; Take 1 tablet (40 mg total) by mouth once daily.  Dispense: 30 tablet;  Refill: 2  -     CBC W/ AUTO DIFFERENTIAL; Future; Expected date: 02/08/2024  -     Comprehensive Metabolic Panel; Future; Expected date: 02/08/2024  -     Lipid Panel; Future; Expected date: 02/08/2024  -     Hemoglobin A1C; Future; Expected date: 02/08/2024    Erectile dysfunction, unspecified erectile dysfunction type  - Chronic. Unontrolled. Refills as below. Labs as below. Follow up results.  -     sildenafiL (VIAGRA) 50 MG tablet; Take 1 tablet (50 mg total) by mouth daily as needed for Erectile Dysfunction.  Dispense: 30 tablet; Refill: 1  -     CBC W/ AUTO DIFFERENTIAL; Future; Expected date: 02/08/2024  -     Comprehensive Metabolic Panel; Future; Expected date: 02/08/2024    Muscle spasm of both lower legs  - Chronic. Uncontrolled. Refills as below.  - Encouraged patient on attending PT regularly especially to learn specific exercises to target muscle strength to improve mobility. Patient verbally agreed.  -     acetaminophen (TYLENOL) 500 MG tablet; Take 2 tablets (1,000 mg total) by mouth every 8 (eight) hours as needed for Pain.  Dispense: 30 tablet; Refill: 0  -     diclofenac sodium (SOLARAZE) 3 % gel; Apply topically once daily.  Dispense: 100 g; Refill: 0  -     Ambulatory referral/consult to Physical/Occupational Therapy; Future; Expected date: 02/15/2024    Neuropathic pain of lower extremity, unspecified laterality  -  Plan as in muscle spasms.  -     acetaminophen (TYLENOL) 500 MG tablet; Take 2 tablets (1,000 mg total) by mouth every 8 (eight) hours as needed for Pain.  Dispense: 30 tablet; Refill: 0  -     diclofenac sodium (SOLARAZE) 3 % gel; Apply topically once daily.  Dispense: 100 g; Refill: 0    Bilateral leg pain  -  Plan as in muscle spasms.  -     acetaminophen (TYLENOL) 500 MG tablet; Take 2 tablets (1,000 mg total) by mouth every 8 (eight) hours as needed for Pain.  Dispense: 30 tablet; Refill: 0  -     diclofenac sodium (SOLARAZE) 3 % gel; Apply topically once daily.  Dispense: 100  g; Refill: 0  -     Ambulatory referral/consult to Physical/Occupational Therapy; Future; Expected date: 02/15/2024    Screening for malignant neoplasm of colon  -     Cologuard Screening (Multitarget Stool DNA); Future; Expected date: 02/08/2024    Need for hepatitis C screening test  -     HEPATITIS C ANTIBODY; Future; Expected date: 02/08/2024    Screening for AAA (aortic abdominal aneurysm)  -     US Abdominal Aorta; Future; Expected date: 02/08/2024        Provided patient with anticipatory guidance and return precautions. Treatment plan discussed with patient, all questions answered, and patient acknowledged understanding and verbal agreement.      Follow-up in: 3 months; or sooner PRN if acute concerns arise.      Case discussed with Dr. KARRI Monreal  ________________________  Benjamin Espinoza MD  [facility] Family Medicine PGY-2

## 2024-02-08 NOTE — LETTER
February 8, 2024      [facility]  [address], [address]  [place] [address]  Phone: [phone]  Fax: [phone]       Patient: Perry Sampson   YOB: 1960  Date of Visit: 02/08/2024    To Whom It May Concern:    Nasrin Sampson  was at [facility] on 02/08/2024. Patient with mobility issues necessitating the need for a mobility device such as a scooter. If you have any questions or concerns, or if I can be of further assistance, please do not hesitate to contact me.    Sincerely,    Zita Bedoya LPN

## 2024-03-08 LAB — NONINV COLON CA DNA+OCC BLD SCRN STL QL: NEGATIVE

## 2024-04-24 ENCOUNTER — OFFICE VISIT (OUTPATIENT)
Dept: FAMILY MEDICINE | Facility: HOSPITAL | Age: 64
End: 2024-04-24
Payer: MEDICARE

## 2024-04-24 VITALS
HEIGHT: 66 IN | BODY MASS INDEX: 21.47 KG/M2 | DIASTOLIC BLOOD PRESSURE: 74 MMHG | WEIGHT: 133.63 LBS | SYSTOLIC BLOOD PRESSURE: 113 MMHG | HEART RATE: 69 BPM

## 2024-04-24 DIAGNOSIS — E78.5 HYPERLIPIDEMIA, UNSPECIFIED HYPERLIPIDEMIA TYPE: ICD-10-CM

## 2024-04-24 DIAGNOSIS — R26.2 DIFFICULTY WALKING: ICD-10-CM

## 2024-04-24 DIAGNOSIS — I69.30 HISTORY OF CEREBROVASCULAR ACCIDENT (CVA) WITH RESIDUAL DEFICIT: Primary | ICD-10-CM

## 2024-04-24 DIAGNOSIS — N52.9 ERECTILE DYSFUNCTION, UNSPECIFIED ERECTILE DYSFUNCTION TYPE: ICD-10-CM

## 2024-04-24 DIAGNOSIS — Z28.82 VACCINE REFUSED BY PARENT: ICD-10-CM

## 2024-04-24 PROCEDURE — 99213 OFFICE O/P EST LOW 20 MIN: CPT

## 2024-04-24 RX ORDER — SILDENAFIL 50 MG/1
50 TABLET, FILM COATED ORAL DAILY PRN
Qty: 30 TABLET | Refills: 2 | Status: SHIPPED | OUTPATIENT
Start: 2024-04-24 | End: 2024-06-13 | Stop reason: SDUPTHER

## 2024-04-24 RX ORDER — ATORVASTATIN CALCIUM 40 MG/1
40 TABLET, FILM COATED ORAL DAILY
Qty: 90 TABLET | Refills: 3 | Status: SHIPPED | OUTPATIENT
Start: 2024-04-24 | End: 2025-04-24

## 2024-04-30 ENCOUNTER — TELEPHONE (OUTPATIENT)
Dept: FAMILY MEDICINE | Facility: HOSPITAL | Age: 64
End: 2024-04-30
Payer: MEDICARE

## 2024-04-30 NOTE — TELEPHONE ENCOUNTER
Called patient and informed him we are waiting on Dr. Espinoza to finish his notes so I can send orders. Patient verbalized understanding.

## 2024-04-30 NOTE — TELEPHONE ENCOUNTER
----- Message from Elena Dasilva sent at 4/30/2024  9:07 AM CDT -----  Regarding: [facility]/ scooter  Type:  Needs Medical Advice    Who Called: pt   Would the patient rather a call back or a response via [facility]? Call  Best Call Back Number: [phone]  Additional Information: pt stated he called [facility] and they never received the the prescription for his scooter

## 2024-05-01 RX ORDER — ASPIRIN 81 MG/1
81 TABLET ORAL DAILY
Qty: 90 TABLET | Refills: 3 | Status: SHIPPED | OUTPATIENT
Start: 2024-05-01 | End: 2025-05-01

## 2024-05-01 NOTE — PROGRESS NOTES
"  [facility] FAMILY PRACTICE CLINIC NOTE  Follow-up Visit      SUBJECTIVE:     Patient: Perry Sampson is a 63 y.o. male.    Chief Compliant:   Chief Complaint   Patient presents with    Follow-up       History of Present Illness:  Difficulty walking - hx of CVA with residual left sided weakness. Previous PT referrals. Request scooter to help with mobility.      ROS  A 10+ review of systems was performed with pertinent positives and negatives noted above in the history of present illness. Other systems were negative unless otherwise specified.    OBJECTIVE:     Vital Signs (Most Recent)  Vitals:    04/24/24 0925   BP: 113/74   Pulse: 69   Weight: 60.6 kg (133 lb 9.6 oz)   Height: 5' 6" (1.676 m)     BMI: Body mass index is 21.56 kg/m².     Physical Exam:  Physical Exam  Vitals and nursing note reviewed.   Constitutional:       General: He is not in acute distress.     Appearance: Normal appearance. He is normal weight. He is not ill-appearing, toxic-appearing or diaphoretic.   HENT:      Head: Normocephalic.      Right Ear: External ear normal.      Left Ear: External ear normal.      Nose: Nose normal.      Mouth/Throat:      Pharynx: Oropharynx is clear.   Eyes:      Extraocular Movements: Extraocular movements intact.   Cardiovascular:      Rate and Rhythm: Normal rate and regular rhythm.      Pulses: Normal pulses.      Heart sounds: Normal heart sounds.   Pulmonary:      Effort: Pulmonary effort is normal. No respiratory distress.      Breath sounds: Normal breath sounds. No wheezing, rhonchi or rales.   Abdominal:      General: Abdomen is flat. There is no distension.      Palpations: Abdomen is soft.      Tenderness: There is no abdominal tenderness. There is no guarding or rebound.   Musculoskeletal:      Cervical back: Normal range of motion.      Comments: Walks with cane.  Strength of lower leg extension and flexion 4/5 bilaterally. Upper leg strength 2/5 left and 4/5 right. Pain not reproducible on exam.   Skin:   "   General: Skin is warm.   Neurological:      General: No focal deficit present.      Mental Status: He is alert and oriented to person, place, and time. Mental status is at baseline.   Psychiatric:         Mood and Affect: Mood normal.         Behavior: Behavior normal.         Thought Content: Thought content normal.          ASSESSMENT:   Perry Sampson is a 63 y.o. male who presents to clinic to for    1. History of cerebrovascular accident (CVA) with residual deficit    2. Difficulty walking    3. Erectile dysfunction, unspecified erectile dysfunction type    4. Hyperlipidemia, unspecified hyperlipidemia type    5. Vaccine refused by parent         PLAN:     History of cerebrovascular accident (CVA) with residual deficit  - Chronic condition.  - Medications and/or medication refills as below.   -     aspirin (ECOTRIN) 81 MG EC tablet; Take 1 tablet (81 mg total) by mouth once daily.  Dispense: 90 tablet; Refill: 3    Difficulty walking  - Chronic condition.  - Orders, labs, and imaging ordered as below. Follow-up results with patient.   -     MOTORIZED SCOOTER FOR HOME USE    Erectile dysfunction, unspecified erectile dysfunction type  - Chronic condition.  - Well controlled.  - Medications and/or medication refills as below.   -     sildenafiL (VIAGRA) 50 MG tablet; Take 1 tablet (50 mg total) by mouth daily as needed for Erectile Dysfunction.  Dispense: 30 tablet; Refill: 2    Hyperlipidemia, unspecified hyperlipidemia type  - Chronic condition.  - Reviewed previous labs, tests, and/or imaging.  - Medications and/or medication refills as below.   -     atorvastatin (LIPITOR) 40 MG tablet; Take 1 tablet (40 mg total) by mouth once daily.  Dispense: 90 tablet; Refill: 3    Vaccine refused by parent  - Discussed benefits and risks of vaccines. Patient declined.       Provided patient with anticipatory guidance and return precautions. Treatment plan discussed with patient, all questions answered, and patient  acknowledged understanding and verbal agreement.      Follow-up in: 1 months; or sooner PRN if acute concerns arise.      Case discussed with Dr. MEGAN Woods    ________________________  Benjamin Espinoza MD  [facility] Family Medicine PGY-2

## 2024-05-09 ENCOUNTER — TELEPHONE (OUTPATIENT)
Dept: FAMILY MEDICINE | Facility: HOSPITAL | Age: 64
End: 2024-05-09
Payer: MEDICARE

## 2024-05-09 NOTE — TELEPHONE ENCOUNTER
----- Message from Elena Dasilva sent at 5/9/2024  9:59 AM CDT -----  Regarding: companies for scooter  Type:  Scooter    Who Called: pt  Would the patient rather a call back or a response via [facility]? Call  Best Call Back Number: [phone]  Additional Information: pt has two companies number that takes his insurance for the order for his scooter to be sent over to [facility] [phone] / [facility] [phone].

## 2024-05-09 NOTE — TELEPHONE ENCOUNTER
----- Message from Elena Dasilva sent at 5/9/2024  9:59 AM CDT -----  Regarding: companies for scooter  Type:  Scooter    Who Called: pt  Would the patient rather a call back or a response via [facility]? Call  Best Call Back Number: [phone]  Additional Information: pt has two companies number that takes his insurance for the order for his scooter to be sent over to [facility] [phone] / [facility] [phone].

## 2024-06-13 DIAGNOSIS — N52.9 ERECTILE DYSFUNCTION, UNSPECIFIED ERECTILE DYSFUNCTION TYPE: ICD-10-CM

## 2024-06-13 RX ORDER — SILDENAFIL 50 MG/1
50 TABLET, FILM COATED ORAL DAILY PRN
Qty: 30 TABLET | Refills: 2 | Status: SHIPPED | OUTPATIENT
Start: 2024-06-13 | End: 2024-09-11

## 2024-06-13 NOTE — TELEPHONE ENCOUNTER
----- Message from Elena Dasilva sent at 6/13/2024 12:04 PM CDT -----  Regarding: refill  Type:  RX Refill Request    Who Called: pt  Refill or New Rx:refill  RX Name and Strength:sildenafiL (VIAGRA) 50 MG tablet  Preferred Pharmacy with phone number:[facility]/pharmacy #[id number] - [place] - [address]  [address] [place] [address]  Phone: [phone] Fax: [phone]  Local or Mail Order:Local  Ordering Provider:Olga  Would the patient rather a call back or a response via [facility]? Call  Best Call Back Number:[phone]  Additional Information:

## 2024-08-01 ENCOUNTER — DOCUMENTATION ONLY (OUTPATIENT)
Dept: FAMILY MEDICINE | Facility: HOSPITAL | Age: 64
End: 2024-08-01
Payer: MEDICARE

## 2024-08-21 ENCOUNTER — TELEPHONE (OUTPATIENT)
Dept: FAMILY MEDICINE | Facility: HOSPITAL | Age: 64
End: 2024-08-21
Payer: MEDICARE

## 2024-08-21 NOTE — TELEPHONE ENCOUNTER
----- Message from Kaykay Becker sent at 8/21/2024 12:46 PM CDT -----  Type:  scooter     Who Called:jose manuel orozco/ chela  Does the patient know what this is regarding?: chela received an order in May regarding scooter  Would like to know if scooter is still needed?  Would the patient rather a call back or a response via [facility]? Call   Best Call Back Number:   Additional Information:

## 2024-09-09 DIAGNOSIS — N52.9 ERECTILE DYSFUNCTION, UNSPECIFIED ERECTILE DYSFUNCTION TYPE: ICD-10-CM

## 2024-09-10 RX ORDER — SILDENAFIL 50 MG/1
50 TABLET, FILM COATED ORAL DAILY PRN
Qty: 30 TABLET | Refills: 2 | Status: SHIPPED | OUTPATIENT
Start: 2024-09-10 | End: 2024-12-09

## 2024-11-26 ENCOUNTER — TELEPHONE (OUTPATIENT)
Dept: FAMILY MEDICINE | Facility: HOSPITAL | Age: 64
End: 2024-11-26
Payer: MEDICARE

## 2024-11-26 NOTE — TELEPHONE ENCOUNTER
----- Message from Yosef sent at 11/26/2024 10:56 AM CST -----  .Type:  Needs Medical Advice    Who Called: Charley with [employer], care manager    Would the patient rather a call back or a response via [facility]? Call back  Best Call Back Number:   Additional Information:     Charley stated she is calling to follow up on the pt order for a motorized scooter

## 2024-11-26 NOTE — TELEPHONE ENCOUNTER
Called Charley at [facility] no answer left a voicemail for a call back. Also called patient regarding his scooter last time we spoke to patient patieleanor stated he bought a used scooter but since then it has broke so he needs a new one. Patient gave me number to [employer] and ask I call and speak to Marcelina who would be able to let me know what he needs to move forward with scooter. I called Marcelina and she will fax me paperwork on what they need to move forward with scooter.

## 2024-11-27 ENCOUNTER — TELEPHONE (OUTPATIENT)
Dept: FAMILY MEDICINE | Facility: HOSPITAL | Age: 64
End: 2024-11-27
Payer: MEDICARE

## 2024-11-27 NOTE — TELEPHONE ENCOUNTER
Called Charley let her know we are working on getting this done for patient we have scheduled patient to come in to be evaluated by doctor. Patient has been scheduled.     ----- Message from Kami sent at 11/27/2024 10:06 AM CST -----  Type:  Returning Call    Who Called: [employer] -  Charley   Does the patient know what this is regarding?: returning missed call about motorized scooter   Would the patient rather a call back or a response via [facility]? Call   Best Call Back Number: [phone]   Additional Information:

## 2024-12-09 ENCOUNTER — OFFICE VISIT (OUTPATIENT)
Dept: FAMILY MEDICINE | Facility: HOSPITAL | Age: 64
End: 2024-12-09
Payer: MEDICARE

## 2024-12-09 VITALS
HEIGHT: 66 IN | BODY MASS INDEX: 20.2 KG/M2 | SYSTOLIC BLOOD PRESSURE: 113 MMHG | HEART RATE: 76 BPM | DIASTOLIC BLOOD PRESSURE: 74 MMHG | OXYGEN SATURATION: 99 % | WEIGHT: 125.69 LBS

## 2024-12-09 DIAGNOSIS — I69.30 HISTORY OF CEREBROVASCULAR ACCIDENT (CVA) WITH RESIDUAL DEFICIT: ICD-10-CM

## 2024-12-09 DIAGNOSIS — R26.2 DIFFICULTY WALKING: Primary | ICD-10-CM

## 2024-12-09 PROCEDURE — 99213 OFFICE O/P EST LOW 20 MIN: CPT

## 2024-12-09 NOTE — PROGRESS NOTES
"  [facility] CLINIC NOTE  Follow-up Visit      SUBJECTIVE:     Patient: Perry Sampson is a 64 y.o. male.    Chief Compliant:   Chief Complaint   Patient presents with    dme order       History of Present Illness:  DME - reports difficulty ambulating. PMHx of CVA with residual left lower extremity weakness affecting ambulation. Currently uses a cane with difficulty. Has used a scooter in the past. Reports past PT with little to no improvement. Patient walks slowly and less than a block before having to stop 2/2 to MSK difficulty.      Review of Systems   Constitutional:  Negative for fever.   Respiratory:  Negative for shortness of breath.    Cardiovascular:  Negative for chest pain.   Gastrointestinal:  Negative for abdominal pain.   Neurological:  Positive for weakness. Negative for headaches.     A 10+ review of systems was performed with pertinent positives and negatives noted above in the history of present illness. Other systems were negative unless otherwise specified.    OBJECTIVE:     Vital Signs (Most Recent)  Vitals:    12/09/24 1414   BP: 113/74   BP Location: Left arm   Patient Position: Sitting   Pulse: 76   SpO2: 99%   Weight: 57 kg (125 lb 10.6 oz)   Height: 5' 6" (1.676 m)     BMI: Body mass index is 20.28 kg/m².     Physical Exam:  Physical Exam  Vitals and nursing note reviewed.   Constitutional:       General: He is not in acute distress.     Appearance: Normal appearance. He is normal weight. He is not ill-appearing, toxic-appearing or diaphoretic.   HENT:      Head: Normocephalic and atraumatic.      Right Ear: External ear normal.      Left Ear: External ear normal.      Nose: Nose normal.      Mouth/Throat:      Pharynx: Oropharynx is clear.   Eyes:      Extraocular Movements: Extraocular movements intact.   Cardiovascular:      Rate and Rhythm: Normal rate and regular rhythm.      Pulses: Normal pulses.      Heart sounds: Normal heart sounds.   Pulmonary:      Effort: Pulmonary effort " is normal. No respiratory distress.      Breath sounds: Normal breath sounds. No wheezing, rhonchi or rales.   Musculoskeletal:      Cervical back: Normal range of motion.      Right knee: Decreased range of motion (0 to 90 degrees flexion).      Left knee: Decreased range of motion (0 to 90 degrees flexion).      Comments: Decreased ROM bilaterally. Significant difficulty with ambulation even with cane. Decreased strength 4/5 left lower extremity, 5/5 right lower extremity. Balance affected by difficult ambulation.   Skin:     General: Skin is warm.   Neurological:      General: No focal deficit present.      Mental Status: He is alert and oriented to person, place, and time. Mental status is at baseline.      Gait: Gait abnormal.   Psychiatric:         Mood and Affect: Mood normal.         Behavior: Behavior normal.         Thought Content: Thought content normal.          ASSESSMENT:   Perry Sampson is a 64 y.o. male who presents to clinic to for    1. Difficulty walking    2. History of cerebrovascular accident (CVA) with residual deficit         PLAN:     Difficulty walking  - Chronic condition.  - Uncontrolled.  - Reviewed previous labs, tests, and/or imaging obtained since last visit.  - Discussed possible etiologies with patient including but not limited to residual weakness from CVA.  - Discussed need for DME devices to assist with mobility with patient at this visit.  - Orders, labs, and imaging ordered as below. Follow-up results with patient.  - Mobility limitations significantly impairs patient's ability to participate in one or more MRADL's in the home and the use of a scooter will significantly improve the patient's ability to participate in MRADL's.    -     MOTORIZED SCOOTER FOR HOME USE    History of cerebrovascular accident (CVA) with residual deficit  - Plan as above.  -     MOTORIZED SCOOTER FOR HOME USE        Provided patient with anticipatory guidance and return precautions. Treatment plan  discussed with patient, all questions answered, and patient acknowledged understanding and verbal agreement.      Follow-up in: 2 months for annual exam; or sooner PRN if acute concerns arise.      ________________________  Benjamin Espinoza MD  [facility] Family Medicine PGY-3

## 2025-01-30 DIAGNOSIS — N52.9 ERECTILE DYSFUNCTION, UNSPECIFIED ERECTILE DYSFUNCTION TYPE: ICD-10-CM

## 2025-01-30 RX ORDER — SILDENAFIL 50 MG/1
50 TABLET, FILM COATED ORAL DAILY PRN
Qty: 30 TABLET | Refills: 2 | Status: CANCELLED | OUTPATIENT
Start: 2025-01-30 | End: 2025-04-30

## 2025-01-30 NOTE — TELEPHONE ENCOUNTER
----- Message from Rosalind sent at 1/30/2025  8:39 AM CST -----  Type:  RX Refill Request    Who Called: pt  Refill or New Rx:refill  RX Name and Strength:sildenafiL (VIAGRA) 50 MG tablet  Preferred Pharmacy with phone number:[facility]/pharmacy #[id number] - [place] - [address] [facility]  [address] [facility] [place] [address]  Phone: [phone] Fax: [phone]  Local or Mail Order:local  Ordering Provider:Olga  Would the patient rather a call back or a response via [facility]? Call   Best Call Back Number: [phone]  Additional Information: pt states this is 4th attempt to get medication refilled . Pt states pharm has even reached out regarding refill

## 2025-02-03 DIAGNOSIS — N52.9 ERECTILE DYSFUNCTION, UNSPECIFIED ERECTILE DYSFUNCTION TYPE: ICD-10-CM

## 2025-02-03 NOTE — TELEPHONE ENCOUNTER
----- Message from Patrizia sent at 1/31/2025  2:53 PM CST -----  Regarding: refills  Contact: [phone]  Type:  RX Refill Request    Who Called: PT   Refill or New Rx:Refills   RX Name and Strength:  sildenafiL (VIAGRA) 50 MG tablet 30 tablet  - How is the patient currently taking it? (ex. 1XDay):  - Route: Take 1 tablet (50 mg total) by mouth daily as needed for Erectile Dysfunction. - Ora  Is this a 30 day or 90 day RX: 30   Preferred Pharmacy with phone number: [facility]/pharmacy #[id number] - [place] - [address] [facility] [facility]  Phone: [phone]  Fax: [phone]

## 2025-02-05 RX ORDER — SILDENAFIL 50 MG/1
50 TABLET, FILM COATED ORAL DAILY PRN
Qty: 30 TABLET | Refills: 2 | Status: SHIPPED | OUTPATIENT
Start: 2025-02-05 | End: 2025-05-06

## 2025-02-26 ENCOUNTER — OFFICE VISIT (OUTPATIENT)
Dept: FAMILY MEDICINE | Facility: HOSPITAL | Age: 65
End: 2025-02-26
Payer: MEDICARE

## 2025-02-26 VITALS
HEART RATE: 79 BPM | SYSTOLIC BLOOD PRESSURE: 121 MMHG | HEIGHT: 66 IN | DIASTOLIC BLOOD PRESSURE: 78 MMHG | BODY MASS INDEX: 20.28 KG/M2

## 2025-02-26 DIAGNOSIS — M25.562 CHRONIC PAIN OF BOTH KNEES: Primary | ICD-10-CM

## 2025-02-26 DIAGNOSIS — M54.42 CHRONIC MIDLINE LOW BACK PAIN WITH BILATERAL SCIATICA: ICD-10-CM

## 2025-02-26 DIAGNOSIS — M54.41 CHRONIC MIDLINE LOW BACK PAIN WITH BILATERAL SCIATICA: ICD-10-CM

## 2025-02-26 DIAGNOSIS — Z13.6 ENCOUNTER FOR SCREENING FOR ABDOMINAL AORTIC ANEURYSM (AAA) IN PATIENT 50 YEARS OF AGE OR OLDER WITHOUT OTHER RISK FACTORS FOR AAA: ICD-10-CM

## 2025-02-26 DIAGNOSIS — E78.5 HYPERLIPIDEMIA, UNSPECIFIED HYPERLIPIDEMIA TYPE: ICD-10-CM

## 2025-02-26 DIAGNOSIS — I69.30 HISTORY OF CEREBROVASCULAR ACCIDENT (CVA) WITH RESIDUAL DEFICIT: ICD-10-CM

## 2025-02-26 DIAGNOSIS — M25.561 CHRONIC PAIN OF BOTH KNEES: Primary | ICD-10-CM

## 2025-02-26 DIAGNOSIS — R73.03 PREDIABETES: ICD-10-CM

## 2025-02-26 DIAGNOSIS — G89.29 CHRONIC PAIN OF BOTH KNEES: Primary | ICD-10-CM

## 2025-02-26 DIAGNOSIS — G89.29 CHRONIC MIDLINE LOW BACK PAIN WITH BILATERAL SCIATICA: ICD-10-CM

## 2025-02-26 PROCEDURE — 99214 OFFICE O/P EST MOD 30 MIN: CPT

## 2025-02-27 RX ORDER — LIDOCAINE 50 MG/G
2 PATCH TOPICAL DAILY
Qty: 30 PATCH | Refills: 11 | Status: SHIPPED | OUTPATIENT
Start: 2025-02-27

## 2025-02-27 RX ORDER — DEXTROMETHORPHAN HYDROBROMIDE, GUAIFENESIN 5; 100 MG/5ML; MG/5ML
650 LIQUID ORAL EVERY 8 HOURS
Qty: 90 TABLET | Refills: 0 | Status: SHIPPED | OUTPATIENT
Start: 2025-02-27 | End: 2025-03-29

## 2025-02-27 RX ORDER — DICLOFENAC SODIUM 10 MG/G
2 GEL TOPICAL 2 TIMES DAILY PRN
Qty: 450 G | Refills: 2 | Status: SHIPPED | OUTPATIENT
Start: 2025-02-27

## 2025-02-27 NOTE — PROGRESS NOTES
"  [facility] FAMILY PRACTICE CLINIC NOTE  Follow-up Visit      SUBJECTIVE:     Patient: Perry Sampson is a 64 y.o. male.    Chief Compliant:   Chief Complaint   Patient presents with    Follow-up       History of Present Illness:  Knee and back pain - chronic. Bilateral knees and lower back. Imaging last done 2+ years ago. Endorses associated numbness and tingling down left leg. Denies recent trauma, falls, accidents. Denies incontinence, pain waking him up at night. Reports taking approx "3 goodies" when pain at worses. Denies history of GI bleeds.      Review of Systems   Constitutional:  Negative for fever.   Respiratory:  Negative for shortness of breath.    Cardiovascular:  Negative for chest pain.   Gastrointestinal:  Negative for abdominal pain.   Musculoskeletal:  Positive for joint pain.   Neurological:  Negative for headaches.     A 10+ review of systems was performed with pertinent positives and negatives noted above in the history of present illness. Other systems were negative unless otherwise specified.    OBJECTIVE:     Vital Signs (Most Recent)  Vitals:    02/26/25 0932   BP: 121/78   Patient Position: Sitting   Pulse: 79   Height: 5' 6" (1.676 m)     BMI: Body mass index is 20.28 kg/m².     Physical Exam:  Physical Exam  Vitals and nursing note reviewed.   Constitutional:       General: He is not in acute distress.     Appearance: Normal appearance. He is normal weight. He is not ill-appearing, toxic-appearing or diaphoretic.      Comments: Patient with automated scooter and cane.   HENT:      Head: Normocephalic and atraumatic.      Right Ear: External ear normal.      Left Ear: External ear normal.      Nose: Nose normal.      Mouth/Throat:      Pharynx: Oropharynx is clear.   Eyes:      Extraocular Movements: Extraocular movements intact.   Cardiovascular:      Rate and Rhythm: Normal rate and regular rhythm.      Pulses: Normal pulses.      Heart sounds: Normal heart sounds.   Pulmonary:      Effort: " Pulmonary effort is normal. No respiratory distress.      Breath sounds: Normal breath sounds. No wheezing, rhonchi or rales.   Musculoskeletal:      Cervical back: Normal range of motion.      Right knee: Decreased range of motion (0 to 90 degrees flexion).      Left knee: Decreased range of motion (0 to 90 degrees flexion).      Comments: Decreased ROM bilaterally. Significant difficulty with ambulation even with cane. Decreased strength 4/5 left lower extremity, 5/5 right lower extremity. Balance affected by difficult ambulation.   Skin:     General: Skin is warm.   Neurological:      General: No focal deficit present.      Mental Status: He is alert and oriented to person, place, and time. Mental status is at baseline.      Gait: Gait abnormal.   Psychiatric:         Mood and Affect: Mood normal.         Behavior: Behavior normal.         Thought Content: Thought content normal.          ASSESSMENT:   Perry Sampson is a 64 y.o. male who presents to clinic to for    1. Chronic pain of both knees    2. Chronic midline low back pain with bilateral sciatica    3. Encounter for screening for abdominal aortic aneurysm (AAA) in patient 50 years of age or older without other risk factors for AAA    4. History of cerebrovascular accident (CVA) with residual deficit    5. Hyperlipidemia, unspecified hyperlipidemia type    6. Prediabetes         PLAN:     Chronic pain of both knees  - Chronic condition.  - Uncontrolled.  - Reviewed previous labs, tests, and/or imaging obtained since last visit.  - Discussed various diagnostic and treatment options with patient at this visit.  - Orders, labs, and imaging ordered as below. Follow-up results with patient.   -     X-Ray Knee Complete 4 Or More Views Bilat; Future; Expected date: 02/26/2025  -     acetaminophen (TYLENOL) 650 MG TbSR; Take 1 tablet (650 mg total) by mouth every 8 (eight) hours.  Dispense: 90 tablet; Refill: 0  -     LIDOcaine (LIDODERM) 5 %; Place 2 patches onto  the skin once daily. Remove & Discard patch within 12 hours or as directed by MD  Dispense: 30 patch; Refill: 11  -     diclofenac sodium (VOLTAREN) 1 % Gel; Apply 2 g topically 2 (two) times daily as needed.  Dispense: 450 g; Refill: 2    Chronic midline low back pain with bilateral sciatica  - Chronic condition.  - Uncontrolled.  - Reviewed previous labs, tests, and/or imaging obtained since last visit.  - Discussed various diagnostic and treatment options with patient at this visit.  - Orders, labs, and imaging ordered as below. Follow-up results with patient.   -     X-Ray Lumbar Spine 5 View; Future; Expected date: 02/26/2025  -     acetaminophen (TYLENOL) 650 MG TbSR; Take 1 tablet (650 mg total) by mouth every 8 (eight) hours.  Dispense: 90 tablet; Refill: 0  -     LIDOcaine (LIDODERM) 5 %; Place 2 patches onto the skin once daily. Remove & Discard patch within 12 hours or as directed by MD  Dispense: 30 patch; Refill: 11  -     diclofenac sodium (VOLTAREN) 1 % Gel; Apply 2 g topically 2 (two) times daily as needed.  Dispense: 450 g; Refill: 2    Encounter for screening for abdominal aortic aneurysm (AAA) in patient 50 years of age or older without other risk factors for AAA  - Screening indicated. Discussed with patient. Orders as below. Follow up results.   -     US Abdomen Limited; Future; Expected date: 02/26/2025    History of cerebrovascular accident (CVA) with residual deficit  - Chronic condition.  - Controlled.  - Reviewed previous labs, tests, and/or imaging obtained since last visit.  - Discussed various diagnostic and treatment options with patient at this visit.  - Orders, labs, and imaging ordered as below. Follow-up results with patient.   -     HEMOGLOBIN A1C; Future; Expected date: 02/26/2025  -     CBC W/ AUTO DIFFERENTIAL; Future; Expected date: 02/26/2025  -     Comprehensive Metabolic Panel; Future; Expected date: 02/26/2025    Hyperlipidemia, unspecified hyperlipidemia type  - Chronic  condition.  - Controlled.  - Reviewed previous labs, tests, and/or imaging obtained since last visit.  - Discussed various diagnostic and treatment options with patient at this visit.  - Orders, labs, and imaging ordered as below. Follow-up results with patient.   -     HEMOGLOBIN A1C; Future; Expected date: 02/26/2025  -     CBC W/ AUTO DIFFERENTIAL; Future; Expected date: 02/26/2025  -     Comprehensive Metabolic Panel; Future; Expected date: 02/26/2025    Prediabetes  - Screening indicated. Discussed with patient. Orders as below. Follow up results.   -     HEMOGLOBIN A1C; Future; Expected date: 02/26/2025       Provided patient with anticipatory guidance and return precautions. Treatment plan discussed with patient, all questions answered, and patient acknowledged understanding and verbal agreement.      Follow-up in: 1 months; or sooner PRN if acute concerns arise.      ________________________  Benjamin Espinoza MD  \[facility]\"" Family Medicine PGY-3

## 2025-03-03 ENCOUNTER — HOSPITAL ENCOUNTER (OUTPATIENT)
Dept: RADIOLOGY | Facility: HOSPITAL | Age: 65
Discharge: HOME | End: 2025-03-03
Payer: MEDICARE

## 2025-03-03 ENCOUNTER — RESULTS FOLLOW-UP (OUTPATIENT)
Dept: OBSTETRICS AND GYNECOLOGY | Facility: CLINIC | Age: 65
End: 2025-03-03
Payer: MEDICARE

## 2025-03-03 DIAGNOSIS — M54.42 CHRONIC MIDLINE LOW BACK PAIN WITH BILATERAL SCIATICA: ICD-10-CM

## 2025-03-03 DIAGNOSIS — M25.562 CHRONIC PAIN OF BOTH KNEES: ICD-10-CM

## 2025-03-03 DIAGNOSIS — G89.29 CHRONIC MIDLINE LOW BACK PAIN WITH BILATERAL SCIATICA: ICD-10-CM

## 2025-03-03 DIAGNOSIS — G89.29 CHRONIC PAIN OF BOTH KNEES: ICD-10-CM

## 2025-03-03 DIAGNOSIS — M25.561 CHRONIC PAIN OF BOTH KNEES: ICD-10-CM

## 2025-03-03 DIAGNOSIS — M54.41 CHRONIC MIDLINE LOW BACK PAIN WITH BILATERAL SCIATICA: ICD-10-CM

## 2025-03-03 PROCEDURE — 72110 X-RAY EXAM L-2 SPINE 4/>VWS: CPT | Mod: TC,FY

## 2025-03-03 PROCEDURE — 73564 X-RAY EXAM KNEE 4 OR MORE: CPT | Mod: 26,50,, | Performed by: RADIOLOGY

## 2025-03-03 PROCEDURE — 72110 X-RAY EXAM L-2 SPINE 4/>VWS: CPT | Mod: 26,,, | Performed by: RADIOLOGY

## 2025-03-03 PROCEDURE — 73564 X-RAY EXAM KNEE 4 OR MORE: CPT | Mod: TC,50,FY

## 2025-03-24 DIAGNOSIS — E78.5 HYPERLIPIDEMIA, UNSPECIFIED HYPERLIPIDEMIA TYPE: ICD-10-CM

## 2025-03-27 RX ORDER — ATORVASTATIN CALCIUM 40 MG/1
40 TABLET, FILM COATED ORAL DAILY
Qty: 90 TABLET | Refills: 3 | Status: SHIPPED | OUTPATIENT
Start: 2025-03-27 | End: 2026-03-27

## 2025-04-23 DIAGNOSIS — N52.9 ERECTILE DYSFUNCTION, UNSPECIFIED ERECTILE DYSFUNCTION TYPE: ICD-10-CM

## 2025-04-23 RX ORDER — SILDENAFIL 50 MG/1
50 TABLET, FILM COATED ORAL DAILY PRN
Qty: 30 TABLET | Refills: 2 | Status: SHIPPED | OUTPATIENT
Start: 2025-04-23 | End: 2025-07-22

## 2025-06-06 ENCOUNTER — TELEPHONE (OUTPATIENT)
Dept: FAMILY MEDICINE | Facility: HOSPITAL | Age: 65
End: 2025-06-06
Payer: MEDICARE

## 2025-06-09 ENCOUNTER — PATIENT OUTREACH (OUTPATIENT)
Dept: ADMINISTRATIVE | Facility: OTHER | Age: 65
End: 2025-06-09
Payer: MEDICARE

## 2025-06-09 ENCOUNTER — OFFICE VISIT (OUTPATIENT)
Dept: PRIMARY CARE CLINIC | Facility: CLINIC | Age: 65
End: 2025-06-09
Payer: MEDICARE

## 2025-06-09 VITALS
SYSTOLIC BLOOD PRESSURE: 103 MMHG | HEART RATE: 90 BPM | HEIGHT: 66 IN | DIASTOLIC BLOOD PRESSURE: 69 MMHG | BODY MASS INDEX: 20.21 KG/M2 | WEIGHT: 125.75 LBS | TEMPERATURE: 99 F | OXYGEN SATURATION: 100 %

## 2025-06-09 DIAGNOSIS — L03.811 CELLULITIS OF HEAD EXCEPT FACE: Primary | ICD-10-CM

## 2025-06-09 DIAGNOSIS — Z13.6 ENCOUNTER FOR ABDOMINAL AORTIC ANEURYSM (AAA) SCREENING: ICD-10-CM

## 2025-06-09 PROCEDURE — 99999 PR PBB SHADOW E&M-EST. PATIENT-LVL IV: CPT | Mod: PBBFAC,GC,,

## 2025-06-09 RX ORDER — SULFAMETHOXAZOLE AND TRIMETHOPRIM 800; 160 MG/1; MG/1
1 TABLET ORAL 2 TIMES DAILY
Qty: 14 TABLET | Refills: 0 | Status: SHIPPED | OUTPATIENT
Start: 2025-06-09 | End: 2025-06-16

## 2025-06-09 NOTE — PROGRESS NOTES
"  [facility] FAMILY PRACTICE CLINIC NOTE  Follow-up Visit      SUBJECTIVE:     Patient: Perry Sampson is a 64 y.o. male.    Chief Compliant:   Chief Complaint   Patient presents with    Headache       History of Present Illness:  Headache - onset approx 1 week. Reports started after "bump back of head" on the right started. States he got bit by something. Denies bleeding, drainage, fever, chills, vision/hearing changes. Endorses mild tenderness with radiation down to back.      Review of Systems   Constitutional:  Negative for fever.   Respiratory:  Negative for shortness of breath.    Cardiovascular:  Negative for chest pain.   Gastrointestinal:  Negative for abdominal pain.   Neurological:  Positive for headaches.     A 10+ review of systems was performed with pertinent positives and negatives noted above in the history of present illness. Other systems were negative unless otherwise specified.    OBJECTIVE:     Vital Signs (Most Recent)  Vitals:    06/09/25 1311   BP: 103/69   BP Location: Left arm   Patient Position: Sitting   Pulse: 90   Temp: 98.5 °F (36.9 °C)   TempSrc: Oral   SpO2: 100%   Weight: 57 kg (125 lb 11.7 oz)   Height: 5' 6" (1.676 m)     BMI: Body mass index is 20.29 kg/m².     Physical Exam:  Physical Exam  Vitals and nursing note reviewed.   Constitutional:       General: He is not in acute distress.     Appearance: Normal appearance. He is normal weight. He is not ill-appearing, toxic-appearing or diaphoretic.   HENT:      Head: Normocephalic and atraumatic.      Right Ear: External ear normal.      Left Ear: External ear normal.      Nose: Nose normal.      Mouth/Throat:      Pharynx: Oropharynx is clear.   Eyes:      Extraocular Movements: Extraocular movements intact.   Cardiovascular:      Rate and Rhythm: Normal rate and regular rhythm.   Pulmonary:      Effort: Pulmonary effort is normal. No respiratory distress.   Musculoskeletal:      Cervical back: Normal range of motion.   Skin:     Findings: " Lesion (back of head/scalp, under hair. raised lesion with central opening. no drainage, bleeding. mild tenderness to palpation. mild erythema.) present.   Neurological:      Mental Status: He is alert and oriented to person, place, and time. Mental status is at baseline.   Psychiatric:         Mood and Affect: Mood normal.         Behavior: Behavior normal.         Thought Content: Thought content normal.          ASSESSMENT:   Perry Sampson is a 64 y.o. male who presents to clinic to for    1. Cellulitis of head except face    2. Encounter for abdominal aortic aneurysm (AAA) screening         PLAN:     Cellulitis of head except face  - Acute condition.  - Uncontrolled.  - Reviewed previous labs, tests, and/or imaging obtained since last visit.  - Discussed possible etiologies with patient including but not limited to infected hair follicle vs infection after bug bite.  - Discussed various diagnostic and treatment options with patient at this visit.  - Patient to follow-up for symptomatic progression. If no improvement at next visit, would consider imaging such as US soft tissue.   -     sulfamethoxazole-trimethoprim 800-160mg (BACTRIM DS) 800-160 mg Tab; Take 1 tablet by mouth 2 (two) times daily. for 7 days  Dispense: 14 tablet; Refill: 0    Encounter for abdominal aortic aneurysm (AAA) screening  - Screening indicated. Discussed with patient. Orders as below. Follow up results.   -     VAS US AAA Screening; Future        Provided patient with anticipatory guidance and return precautions. Treatment plan discussed with patient, all questions answered, and patient acknowledged understanding and verbal agreement.      Follow-up in: 1-2 weeks; or sooner PRN if acute concerns arise.      ________________________  Benjamin Espinoza MD  [facility] Family Medicine PGY-3

## 2025-06-09 NOTE — PROGRESS NOTES
CHW - Initial Contact    This Community Health Worker completed  the Social Determinant of Health questionnaire with patient during clinic visit today.    Pt identified barriers of most importance are: NONE   Referrals to community agencies completed with patient/caregiver consent outside of [employer] include: NONE  Referrals were put through [employer] [facility] - : NO  Support and Services: No support & services have been documented.  Other information discussed the patient needs / wants help with: NONE   Follow up required: NO  No future outreach task assigned

## 2025-06-11 ENCOUNTER — TELEPHONE (OUTPATIENT)
Dept: PRIMARY CARE CLINIC | Facility: CLINIC | Age: 65
End: 2025-06-11
Payer: MEDICARE

## 2025-06-11 DIAGNOSIS — Z13.6 ENCOUNTER FOR ABDOMINAL AORTIC ANEURYSM (AAA) SCREENING: Primary | ICD-10-CM

## 2025-06-11 NOTE — TELEPHONE ENCOUNTER
Called and confirmed that [facility] would call pt to schedule Ultrasound of ABD aotra. Spoke with Radha at 3:10pm. Number [phone]

## 2025-06-18 ENCOUNTER — TELEPHONE (OUTPATIENT)
Dept: FAMILY MEDICINE | Facility: HOSPITAL | Age: 65
End: 2025-06-18
Payer: MEDICARE

## 2025-06-18 NOTE — TELEPHONE ENCOUNTER
Returned patient phone call patient states he does not feel good hurting his neck  and legs and medication sent is not working. Patient wants to talk to Dr. Espinoza I explained to Dr. Espinoza is not in clinic message will be sent but I am not sure when he will answer his messages. Patient was advised to go to urgent care or ED patient stated he can not go to urgent care they want money.  Patient explained he will go if needed.     Copied from CRM #[id number]. Topic: General Inquiry - Patient Advice  >> Jun 18, 2025  1:50 PM Shayy wrote:  Type:  Needs Medical Advice    Who Called: Pt   Symptoms (please be specific): antibiotic that was prescribed to him isn't working, pt states that he is feeling bad and is having trouble w/ getting up and also getting excessive sweating   How long has patient had these symptoms:  This past Monday   Would the patient rather a call back or a response via [facility]? Call back   Best Call Back Number: [phone]  Additional Information: Please be advised, pt states that he would like a call back as soon as possible

## 2025-06-22 ENCOUNTER — HOSPITAL ENCOUNTER (EMERGENCY)
Facility: HOSPITAL | Age: 65
Discharge: HOME | End: 2025-06-22
Attending: EMERGENCY MEDICINE
Payer: MEDICARE

## 2025-06-22 VITALS
RESPIRATION RATE: 20 BRPM | TEMPERATURE: 99 F | SYSTOLIC BLOOD PRESSURE: 94 MMHG | HEIGHT: 66 IN | DIASTOLIC BLOOD PRESSURE: 67 MMHG | WEIGHT: 140 LBS | OXYGEN SATURATION: 98 % | BODY MASS INDEX: 22.5 KG/M2 | HEART RATE: 96 BPM

## 2025-06-22 DIAGNOSIS — R59.1 LYMPHADENOPATHY: ICD-10-CM

## 2025-06-22 LAB
ABSOLUTE EOSINOPHIL (OHS): 0.19 K/UL
ABSOLUTE MONOCYTE (OHS): 0.99 K/UL (ref 0.3–1)
ABSOLUTE NEUTROPHIL COUNT (OHS): 5.47 K/UL (ref 1.8–7.7)
ALBUMIN SERPL BCP-MCNC: 3.6 G/DL (ref 3.5–5.2)
ALP SERPL-CCNC: 54 UNIT/L (ref 38–126)
ALT SERPL W/O P-5'-P-CCNC: 22 UNIT/L (ref 10–44)
ANION GAP (OHS): 7 MMOL/L (ref 8–16)
AST SERPL-CCNC: 24 UNIT/L (ref 15–46)
BASOPHILS # BLD AUTO: 0.07 K/UL
BASOPHILS NFR BLD AUTO: 0.8 %
BILIRUB SERPL-MCNC: 0.3 MG/DL (ref 0.1–1)
BUN SERPL-MCNC: 11 MG/DL (ref 2–20)
CALCIUM SERPL-MCNC: 9 MG/DL (ref 8.7–10.5)
CHLORIDE SERPL-SCNC: 103 MMOL/L (ref 95–110)
CO2 SERPL-SCNC: 27 MMOL/L (ref 23–29)
CREAT SERPL-MCNC: 0.8 MG/DL (ref 0.5–1.4)
ERYTHROCYTE [DISTWIDTH] IN BLOOD BY AUTOMATED COUNT: 14.4 % (ref 11.5–14.5)
GFR SERPLBLD CREATININE-BSD FMLA CKD-EPI: >60 ML/MIN/1.73/M2
GLUCOSE SERPL-MCNC: 98 MG/DL (ref 70–110)
HCT VFR BLD AUTO: 39.1 % (ref 40–54)
HGB BLD-MCNC: 12.9 GM/DL (ref 14–18)
IMM GRANULOCYTES # BLD AUTO: 0.2 K/UL (ref 0–0.04)
IMM GRANULOCYTES NFR BLD AUTO: 2.4 % (ref 0–0.5)
LYMPHOCYTES # BLD AUTO: 1.47 K/UL (ref 1–4.8)
MCH RBC QN AUTO: 29.1 PG (ref 27–31)
MCHC RBC AUTO-ENTMCNC: 33 G/DL (ref 32–36)
MCV RBC AUTO: 88 FL (ref 82–98)
NUCLEATED RBC (/100WBC) (OHS): 0 /100 WBC
PLATELET # BLD AUTO: 385 K/UL (ref 150–450)
PMV BLD AUTO: 8.9 FL (ref 9.2–12.9)
POTASSIUM SERPL-SCNC: 4.2 MMOL/L (ref 3.5–5.1)
PROT SERPL-MCNC: 7.1 GM/DL (ref 6–8.4)
RBC # BLD AUTO: 4.44 M/UL (ref 4.6–6.2)
RELATIVE EOSINOPHIL (OHS): 2.3 %
RELATIVE LYMPHOCYTE (OHS): 17.5 % (ref 18–48)
RELATIVE MONOCYTE (OHS): 11.8 % (ref 4–15)
RELATIVE NEUTROPHIL (OHS): 65.2 % (ref 38–73)
SODIUM SERPL-SCNC: 137 MMOL/L (ref 136–145)
WBC # BLD AUTO: 8.39 K/UL (ref 3.9–12.7)

## 2025-06-22 PROCEDURE — 25000003 PHARM REV CODE 250: Mod: ER

## 2025-06-22 PROCEDURE — 96372 THER/PROPH/DIAG INJ SC/IM: CPT

## 2025-06-22 PROCEDURE — 63600175 PHARM REV CODE 636 W HCPCS: Mod: ER

## 2025-06-22 PROCEDURE — 99284 EMERGENCY DEPT VISIT MOD MDM: CPT | Mod: 25,ER

## 2025-06-22 PROCEDURE — 80053 COMPREHEN METABOLIC PANEL: CPT | Mod: ER

## 2025-06-22 PROCEDURE — 85025 COMPLETE CBC W/AUTO DIFF WBC: CPT | Mod: ER

## 2025-06-22 RX ORDER — DOXYCYCLINE 100 MG/1
100 CAPSULE ORAL 2 TIMES DAILY
Qty: 13 CAPSULE | Refills: 0 | Status: SHIPPED | OUTPATIENT
Start: 2025-06-22 | End: 2025-06-29

## 2025-06-22 RX ORDER — ACETAMINOPHEN 500 MG
1000 TABLET ORAL 4 TIMES DAILY
Qty: 112 TABLET | Refills: 0 | Status: SHIPPED | OUTPATIENT
Start: 2025-06-22 | End: 2025-07-06

## 2025-06-22 RX ORDER — DEXAMETHASONE SODIUM PHOSPHATE 4 MG/ML
8 INJECTION, SOLUTION INTRA-ARTICULAR; INTRALESIONAL; INTRAMUSCULAR; INTRAVENOUS; SOFT TISSUE
Status: COMPLETED | OUTPATIENT
Start: 2025-06-22 | End: 2025-06-22

## 2025-06-22 RX ORDER — DOXYCYCLINE HYCLATE 100 MG
100 TABLET ORAL
Status: COMPLETED | OUTPATIENT
Start: 2025-06-22 | End: 2025-06-22

## 2025-06-22 RX ADMIN — DOXYCYCLINE HYCLATE 100 MG: 100 TABLET, COATED ORAL at 12:06

## 2025-06-22 RX ADMIN — DEXAMETHASONE SODIUM PHOSPHATE 8 MG: 4 INJECTION, SOLUTION INTRA-ARTICULAR; INTRALESIONAL; INTRAMUSCULAR; INTRAVENOUS; SOFT TISSUE at 11:06

## 2025-06-22 NOTE — DISCHARGE INSTRUCTIONS
Please return to the Emergency Department for any new or worsening symptoms including: worsening abdominal pain, dark\black\bloody bowel movements, vomiting blood, hard abdomen, fever, chest pain, shortness of breath, loss of consciousness or any other concerns.     It is important to remember that some problems are difficult to diagnose and may not be found during your first visit. Be sure to follow up with your primary care doctor and review any labs/imaging that was performed during your visit with them. If you do not have a primary care doctor, you may contact the one listed on your discharge paperwork, or you may also call the [facility] Appointment Desk at [phone] to schedule an appointment.     All medications may potentially have side effects and it is impossible to predict which medications may give you side effects. If you feel that you are having a negative effect of any medication you should immediately stop taking them and seek medical attention. Do not drive or make any important decisions for 24 hours if you have received any pain medications, sedatives or mood altering drugs during your ER visit.    We will be happy to take care of you for all of your future medical needs. You may return to the ER at any time for any new/concerning symptoms, worsening condition, or failure to improve. We hope you feel better soon.     Thank you for allowing me to take care of you today.       Evonne Padron PA-C

## 2025-06-22 NOTE — ED PROVIDER NOTES
Encounter Date: 6/22/2025       History     Chief Complaint   Patient presents with    Abscess     Abscess to right occipital area for 2 weeks. Patient reports he did a course of antibiotics and the abscess has not resolved.      Perry Sampson is a 64 y.o. male  has no past medical history on file. presenting to the Emergency Department for swelling to the left side of his neck that has not increased over the last 2 weeks.  Patient states he was initially bit by a bug on the back of the left side of his head.  Patient was put on Bactrim by his primary care provider.  Patient took antibiotics to completion.  Reports the pain on back of the head has gotten better but the pain on the sides of the neck has continued to get worse.  Patient denies fever.  Reports chills and cold sweats primarily worse at night.  Patient states he has to change out of his shirt about 4 times throughout the night.  Patient is uncertain if he had any weight loss over the last 2 weeks because he has not been eating as much over the last couple of months.  Denies any shortness of breath, chest pain, nausea, vomiting, abdominal pain, diarrhea, constipation.  Patient denies any other complaints at this time.        The history is provided by the patient.     Review of patient's allergies indicates:   Allergen Reactions    Pineapple Hives and Itching    Tomato (solanum lycopersicum) Swelling     History reviewed. No pertinent past medical history.  Past Surgical History:   Procedure Laterality Date    MANDIBLE FRACTURE SURGERY       No family history on file.  Social History[1]  Review of Systems   Constitutional:  Positive for chills. Negative for fever.   HENT:  Negative for sore throat.    Respiratory:  Negative for shortness of breath.    Cardiovascular:  Negative for chest pain.   Gastrointestinal:  Negative for nausea.   Genitourinary:  Negative for dysuria.   Musculoskeletal:  Positive for neck pain. Negative for back pain.   Skin:  Negative  for rash.   Neurological:  Negative for weakness.   Hematological:  Does not bruise/bleed easily.   All other systems reviewed and are negative.      Physical Exam     Initial Vitals [06/22/25 1013]   BP Pulse Resp Temp SpO2   94/67 96 20 98.5 °F (36.9 °C) 98 %      MAP       --         Physical Exam    Nursing note and vitals reviewed.  Constitutional: Vital signs are normal. He appears well-developed and well-nourished. He is not diaphoretic. He is active.  Non-toxic appearance. No distress.   HENT:   Head: Normocephalic and atraumatic.       Right Ear: Hearing, tympanic membrane, external ear and ear canal normal.   Left Ear: Hearing, tympanic membrane, external ear and ear canal normal.   Nose: Nose normal. Right sinus exhibits no maxillary sinus tenderness and no frontal sinus tenderness. Left sinus exhibits no maxillary sinus tenderness and no frontal sinus tenderness. Mouth/Throat: Uvula is midline, oropharynx is clear and moist and mucous membranes are normal. No trismus in the jaw. No uvula swelling. No oropharyngeal exudate, posterior oropharyngeal edema or posterior oropharyngeal erythema.   Eyes: Conjunctivae, EOM and lids are normal. Pupils are equal, round, and reactive to light.   Neck: Phonation normal. Neck supple. No Brudzinski's sign and no Kernig's sign noted.   Normal range of motion.  Cardiovascular:  Normal rate, regular rhythm, normal heart sounds and normal pulses.           Pulmonary/Chest: Breath sounds normal. No respiratory distress. He has no wheezes. He has no rhonchi. He has no rales.   Abdominal: He exhibits no distension.   Musculoskeletal:         General: Normal range of motion.      Cervical back: Normal range of motion and neck supple. No rigidity. Normal range of motion.     Lymphadenopathy:     He has cervical adenopathy.        Left cervical: Superficial cervical adenopathy present.        Left: Supraclavicular adenopathy present.   Neurological: He is alert and oriented to  person, place, and time. He is not disoriented. GCS score is 15. GCS eye subscore is 4. GCS verbal subscore is 5. GCS motor subscore is 6.   Skin: Skin is dry.   Psychiatric: He has a normal mood and affect. His behavior is normal. Judgment and thought content normal.         ED Course   Procedures  Labs Reviewed   COMPREHENSIVE METABOLIC PANEL - Abnormal       Result Value    Sodium 137      Potassium 4.2      Chloride 103      CO2 27      Glucose 98      BUN 11      Creatinine 0.8      Calcium 9.0      Protein Total 7.1      Albumin 3.6      Bilirubin Total 0.3      ALP 54      AST 24      ALT 22      Anion Gap 7 (*)     eGFR >60     CBC WITH DIFFERENTIAL - Abnormal    WBC 8.39      RBC 4.44 (*)     HGB 12.9 (*)     HCT 39.1 (*)     MCV 88      MCH 29.1      MCHC 33.0      RDW 14.4      Platelet Count 385      MPV 8.9 (*)     Nucleated RBC 0      Neut % 65.2      Lymph % 17.5 (*)     Mono % 11.8      Eos % 2.3      Basophil % 0.8      Imm Grans % 2.4 (*)     Neut # 5.47      Lymph # 1.47      Mono # 0.99      Eos # 0.19      Baso # 0.07      Imm Grans # 0.20 (*)    CBC W/ AUTO DIFFERENTIAL    Narrative:     The following orders were created for panel order CBC auto differential.  Procedure                               Abnormality         Status                     ---------                               -----------         ------                     CBC with Differential[[id number]]       Abnormal            Final result                 Please view results for these tests on the individual orders.          Imaging Results              X-Ray Chest 1 View (Final result)  Result time 06/22/25 12:18:06      Final result by Boone Prescott MD (06/22/25 12:18:06)                   Impression:     As above.    Finalized on: 6/22/2025 12:18 PM By:  Boone Prescott MD  [facility]# [id number]      2025-06-22 12:20:14.799     [facility]               Narrative:    EXAM:    XR CHEST 1 VIEW    CLINICAL HISTORY:    Generalized enlarged  lymph nodes.    FINDINGS:  Heart size is normal.  The lung fields are clear.  No acute cardiopulmonary infiltrate.  No convincing mediastinal adenopathy.  Contrast-enhanced CT may provide a better evaluation.                                         Medications   dexAMETHasone injection 8 mg (8 mg Intramuscular Given 6/22/25 1115)   doxycycline tablet 100 mg (100 mg Oral Given 6/22/25 1228)     Medical Decision Making  This is an emergent evaluation of 64 y.o. male in the ED presenting for lymphadenopathy. Physical exam reveals a non-toxic, afebrile, and well-appearing male in no apparent respiratory distress. Pertinent physical exam findings above. Vital signs stable. If available, previous records reviewed.    My overall impression is :  Lymphadenopathy  . Differential Diagnoses: Including but not limited to Sepsis, meningitis, nasal/aspirated foreign body, OM, OE, nasal polyp, bacterial sinusitis, allergic rhinitis, peritonsillar abscess, retropharyngeal abscess, epiglottitis, bacterial/viral pneumonia, bacterial/viral pharyngitis, croup, bronchiolitis, influenza, viral syndrome       Discharge Meds/Instructions: Meds below. Outpatient f/u.     There does not appear to be any indication for further emergent testing, observation, or hospitalization at this time. A mutual shared decision making discussion was had with the patient. Patient appears stable for and is comfortable with discharge home. The diagnosis, treatment plan, instructions for follow-up as well as ED return precautions were discussed. Advised to follow-up with PCP for outpatient follow-up in 2-3 days. Signs and symptoms that would warrant immediate return to ED were reviewed prior to discharge. All questions and concerns were asked, answered, and addressed. Patient expressed understanding and agreement with the plan.     This case was discussed with my attending, Dr. Gold who is in agreement with my assessment and plan.      Amount and/or  Complexity of Data Reviewed  Labs: ordered. Decision-making details documented in ED Course.  Radiology: ordered and independent interpretation performed. Decision-making details documented in ED Course.    Risk  OTC drugs.  Prescription drug management.              Attending Attestation:             Attending ED Notes:   I discussed the management of this patient with the physician assistant but did not perform a face-to-face evaluation of this patient.      ED Course as of 25 1721   Sun 2025   1123 Comprehensive metabolic panel(!)  No significant abnormality [LH]   1139 WBC: 8.39 [LH]   1139 Hemoglobin(!): 12.9 [LH]   1139 Hematocrit(!): 39.1 [LH]   1139 Platelet Count: 385 [LH]   1221 X-Ray Chest 1 View  Independent interpretation by me: no lung consolidation, effusion, or pneumothorax. Cardiac silhouette appears normal. I have also read the radiologist's report and agree with their findings.    [LH]      ED Course User Index  [LH] Evonne Padron PA-C                           Clinical Impression:  Final diagnoses:  [R59.1] Lymphadenopathy          ED Disposition Condition    Discharge Stable          ED Prescriptions       Medication Sig Dispense Start Date End Date Auth. Provider    doxycycline (VIBRAMYCIN) 100 MG Cap Take 1 capsule (100 mg total) by mouth 2 (two) times daily. for 7 days 13 capsule 2025 Evonne Padron PA-C    acetaminophen (TYLENOL) 500 MG tablet Take 2 tablets (1,000 mg total) by mouth 4 (four) times daily. for 14 days 112 tablet 2025 Evonne Padron PA-C          Follow-up Information    None              Evonne Padron PA-C  25 1232         [1]   Social History  Tobacco Use    Smoking status: Former     Current packs/day: 0.00     Types: Cigarettes     Quit date: 7/15/2022     Years since quittin.9    Smokeless tobacco: Never    Tobacco comments:     Daily marijuana for pain   Substance Use Topics    Alcohol use: No    Drug use: No        Blasiar,  Lb MERCADO MD  06/22/25 1841

## 2025-07-03 ENCOUNTER — HOSPITAL ENCOUNTER (EMERGENCY)
Facility: HOSPITAL | Age: 65
Discharge: HOME | End: 2025-07-03
Attending: EMERGENCY MEDICINE
Payer: MEDICARE

## 2025-07-03 VITALS
HEIGHT: 66 IN | DIASTOLIC BLOOD PRESSURE: 78 MMHG | HEART RATE: 68 BPM | OXYGEN SATURATION: 100 % | WEIGHT: 135 LBS | RESPIRATION RATE: 16 BRPM | SYSTOLIC BLOOD PRESSURE: 128 MMHG | TEMPERATURE: 98 F | BODY MASS INDEX: 21.69 KG/M2

## 2025-07-03 DIAGNOSIS — L02.91 ABSCESS: Primary | ICD-10-CM

## 2025-07-03 LAB
ABSOLUTE EOSINOPHIL (OHS): 0.18 K/UL
ABSOLUTE MONOCYTE (OHS): 0.66 K/UL (ref 0.3–1)
ABSOLUTE NEUTROPHIL COUNT (OHS): 4.37 K/UL (ref 1.8–7.7)
ANION GAP (OHS): 8 MMOL/L (ref 8–16)
BASOPHILS # BLD AUTO: 0.06 K/UL
BASOPHILS NFR BLD AUTO: 0.9 %
BUN SERPL-MCNC: 13 MG/DL (ref 2–20)
CALCIUM SERPL-MCNC: 9 MG/DL (ref 8.7–10.5)
CHLORIDE SERPL-SCNC: 103 MMOL/L (ref 95–110)
CO2 SERPL-SCNC: 27 MMOL/L (ref 23–29)
CREAT SERPL-MCNC: 0.9 MG/DL (ref 0.5–1.4)
ERYTHROCYTE [DISTWIDTH] IN BLOOD BY AUTOMATED COUNT: 14.5 % (ref 11.5–14.5)
GFR SERPLBLD CREATININE-BSD FMLA CKD-EPI: >60 ML/MIN/1.73/M2
GLUCOSE SERPL-MCNC: 107 MG/DL (ref 70–110)
HCT VFR BLD AUTO: 38.8 % (ref 40–54)
HGB BLD-MCNC: 13 GM/DL (ref 14–18)
IMM GRANULOCYTES # BLD AUTO: 0.09 K/UL (ref 0–0.04)
IMM GRANULOCYTES NFR BLD AUTO: 1.3 % (ref 0–0.5)
LYMPHOCYTES # BLD AUTO: 1.31 K/UL (ref 1–4.8)
MCH RBC QN AUTO: 29.6 PG (ref 27–31)
MCHC RBC AUTO-ENTMCNC: 33.5 G/DL (ref 32–36)
MCV RBC AUTO: 88 FL (ref 82–98)
NUCLEATED RBC (/100WBC) (OHS): 0 /100 WBC
PLATELET # BLD AUTO: 376 K/UL (ref 150–450)
PMV BLD AUTO: 9.3 FL (ref 9.2–12.9)
POTASSIUM SERPL-SCNC: 4 MMOL/L (ref 3.5–5.1)
RBC # BLD AUTO: 4.39 M/UL (ref 4.6–6.2)
RELATIVE EOSINOPHIL (OHS): 2.7 %
RELATIVE LYMPHOCYTE (OHS): 19.6 % (ref 18–48)
RELATIVE MONOCYTE (OHS): 9.9 % (ref 4–15)
RELATIVE NEUTROPHIL (OHS): 65.6 % (ref 38–73)
SODIUM SERPL-SCNC: 138 MMOL/L (ref 136–145)
WBC # BLD AUTO: 6.67 K/UL (ref 3.9–12.7)

## 2025-07-03 PROCEDURE — 87070 CULTURE OTHR SPECIMN AEROBIC: CPT | Mod: ER | Performed by: EMERGENCY MEDICINE

## 2025-07-03 PROCEDURE — 99285 EMERGENCY DEPT VISIT HI MDM: CPT | Mod: 25,ER

## 2025-07-03 PROCEDURE — 10160 PNXR ASPIR ABSC HMTMA BULLA: CPT | Mod: ER

## 2025-07-03 PROCEDURE — 87075 CULTR BACTERIA EXCEPT BLOOD: CPT | Mod: ER | Performed by: EMERGENCY MEDICINE

## 2025-07-03 PROCEDURE — 25500020 PHARM REV CODE 255: Mod: ER | Performed by: EMERGENCY MEDICINE

## 2025-07-03 PROCEDURE — 85025 COMPLETE CBC W/AUTO DIFF WBC: CPT | Mod: ER | Performed by: EMERGENCY MEDICINE

## 2025-07-03 PROCEDURE — 80048 BASIC METABOLIC PNL TOTAL CA: CPT | Mod: ER | Performed by: EMERGENCY MEDICINE

## 2025-07-03 PROCEDURE — 25000003 PHARM REV CODE 250: Mod: ER | Performed by: EMERGENCY MEDICINE

## 2025-07-03 RX ORDER — LIDOCAINE AND PRILOCAINE 25; 25 MG/G; MG/G
CREAM TOPICAL
Status: COMPLETED | OUTPATIENT
Start: 2025-07-03 | End: 2025-07-03

## 2025-07-03 RX ORDER — SULFAMETHOXAZOLE AND TRIMETHOPRIM 800; 160 MG/1; MG/1
2 TABLET ORAL 2 TIMES DAILY
Qty: 28 TABLET | Refills: 0 | Status: SHIPPED | OUTPATIENT
Start: 2025-07-03 | End: 2025-07-10

## 2025-07-03 RX ADMIN — IOHEXOL 75 ML: 350 INJECTION, SOLUTION INTRAVENOUS at 10:07

## 2025-07-03 RX ADMIN — LIDOCAINE AND PRILOCAINE: 25; 25 CREAM TOPICAL at 12:07

## 2025-07-03 NOTE — DISCHARGE INSTRUCTIONS
Take the antibiotics as prescribed, if the abscess returns, gets larger please return to the Emergency department as it will then need to be cut open. Follow up with your PCP in 2 days for recheck.

## 2025-07-03 NOTE — ED NOTES
"Patient presents to ED for abscess x 2 one to left side of jaw and one ot left clavicle he states they started out as " insect bites" was seen completed " 2 courses of antibiotics" with no improvement. Per patient he states the areas have gotten worse and more tender.   "

## 2025-07-03 NOTE — ED PROVIDER NOTES
Emergency Department Encounter  Provider Note    Perry Sampson  [id number]  7/3/2025    Evaluation:    History Acquisition:     Chief Complaint   Patient presents with    Abscess     Patient has 2 abscess to left neck area for the past 6 weeks. Has been on 2 courses of antibiotics.        History of Present Illness:  Perry Sampson who is a 64 y.o. male who presents to the ED today for lump to his neck. Pt reports that he thought he was bit by a bug, was treated with bactrim by his PCP.  Finished the antibiotics but did bump returned.  He was seen in this ED, reevaluated and placed on doxycycline. Seemed to help.  Finished that antibiotic 5 days ago. However yesterday saw bump returning to the neck area at the clavicle and it got larger overnight.  It is tender to the touch.  No fevers, chills.  No chest pain, shortness of breath, no hemoptysis. Pt reports he had an appt with his PCP yesterday but missed it due to work.        Prior medical records were reviewed:   Pt was seen 25 by PCP for cellulitis to the scalp, placed on sulfamethoxazole/trimethoprim 800-160 mg 1 tablet Oral 2 times daily.  Seen 25 and placed on doxycycline.        The patient's list of active medical history, family/social history, medications, and allergies as documented has been reviewed.     No past medical history on file.  Past Surgical History:   Procedure Laterality Date    MANDIBLE FRACTURE SURGERY       No family history on file.  Social History     Socioeconomic History    Marital status: Single   Tobacco Use    Smoking status: Former     Current packs/day: 0.00     Types: Cigarettes     Quit date: 7/15/2022     Years since quittin.9    Smokeless tobacco: Never    Tobacco comments:     Daily marijuana for pain   Substance and Sexual Activity    Alcohol use: No    Drug use: No     Social Drivers of Health     Financial Resource Strain: Low Risk  (2025)    Overall Financial Resource Strain (CARDIA)     Difficulty of Paying  Living Expenses: Not hard at all   Food Insecurity: No Food Insecurity (6/9/2025)    Hunger Vital Sign     Worried About Running Out of Food in the Last Year: Never true     Ran Out of Food in the Last Year: Never true   Transportation Needs: No Transportation Needs (6/9/2025)    PRAPARE - Transportation     Lack of Transportation (Medical): No     Lack of Transportation (Non-Medical): No   Physical Activity: Sufficiently Active (6/9/2025)    Exercise Vital Sign     Days of Exercise per Week: 7 days     Minutes of Exercise per Session: 30 min   Stress: No Stress Concern Present (6/9/2025)    [employer] - Occupational Stress Questionnaire     Feeling of Stress : Not at all   Housing Stability: Low Risk  (6/9/2025)    Housing Stability Vital Sign     Unable to Pay for Housing in the Last Year: No     Number of Times Moved in the Last Year: 1     Homeless in the Last Year: No       Medications:  Discharge Medication List as of 7/3/2025 12:52 PM        START taking these medications    Details   sulfamethoxazole-trimethoprim 800-160mg (BACTRIM DS) 800-160 mg Tab Take 2 tablets by mouth 2 (two) times daily. for 7 days, Starting Thu 7/3/2025, Until Thu 7/10/2025, Normal           CONTINUE these medications which have NOT CHANGED    Details   aspirin (ECOTRIN) 81 MG EC tablet Take 1 tablet (81 mg total) by mouth once daily., Starting Wed 5/1/2024, Until Thu 5/1/2025, Normal      acetaminophen (TYLENOL) 500 MG tablet Take 2 tablets (1,000 mg total) by mouth 4 (four) times daily. for 14 days, Starting Sun 6/22/2025, Until Sun 7/6/2025, Normal      atorvastatin (LIPITOR) 40 MG tablet Take 1 tablet (40 mg total) by mouth once daily., Starting Thu 3/27/2025, Until Fri 3/27/2026, Normal      diclofenac sodium (VOLTAREN) 1 % Gel Apply 2 g topically 2 (two) times daily as needed., Starting Thu 2/27/2025, Normal      LIDOcaine (LIDODERM) 5 % Place 2 patches onto the skin once daily. Remove & Discard  patch within 12 hours or as directed by MD, Starting Thu 2/27/2025, Normal      sildenafiL (VIAGRA) 50 MG tablet Take 1 tablet (50 mg total) by mouth daily as needed for Erectile Dysfunction., Starting Wed 4/23/2025, Until Tue 7/22/2025 at 2359, Normal             Allergies:  Review of patient's allergies indicates:   Allergen Reactions    Pineapple Hives and Itching    Tomato (solanum lycopersicum) Swelling         Physical Exam:     ED Triage Vitals [07/03/25 0916]   BP 96/62   Pulse 87   Resp 16   Temp 98.4 °F (36.9 °C)   SpO2 98 %     Physical Exam  Constitutional:        HENT:      Head:           Physical Exam  Vitals and nursing note reviewed.   Constitutional:       General: No acute distress.     Appearance: Normal appearance. Well-developed.   HEENT:      Head: Normocephalic and atraumatic.      Mouth: Mucous membranes are moist.      Eyes: No scleral icterus. No discharge. Conjunctivae normal.   Cardiovascular:      Rate and Rhythm: Normal rate.   Pulmonary:      Effort:  Pulmonary effort is normal. No respiratory distress.   Abdominal:      General: There is no distension.   Musculoskeletal:         Gait normal.     Skin:     General: Skin is warm and dry.   Neurological:      Mental Status: Alert and oriented.     Cranial Nerves: No cranial nerve deficit.       Differential Diagnoses:   Based on available information and initial assessment, differential diagnosis includes but is not limited to cellulitis, abscess, lymphadenopathy, malignancy       ED Management:     Orders Placed This Encounter    Aerobic culture (Specify Source) **CANNOT BE ORDERED AS STAT**    Culture, Anaerobe    CT Soft Tissue Neck With Contrast    CBC auto differential    Basic metabolic panel    CBC with Differential    Insert Saline lock IV    iohexoL (OMNIPAQUE 350) injection 75 mL    LIDOcaine-prilocaine cream    sulfamethoxazole-trimethoprim 800-160mg (BACTRIM DS) 800-160 mg Tab           Labs:   Independently interpreted the  labs ordered by myself see ED course  Labs Reviewed   CBC WITH DIFFERENTIAL - Abnormal       Result Value    WBC 6.67      RBC 4.39 (*)     HGB 13.0 (*)     HCT 38.8 (*)     MCV 88      MCH 29.6      MCHC 33.5      RDW 14.5      Platelet Count 376      MPV 9.3      Nucleated RBC 0      Neut % 65.6      Lymph % 19.6      Mono % 9.9      Eos % 2.7      Basophil % 0.9      Imm Grans % 1.3 (*)     Neut # 4.37      Lymph # 1.31      Mono # 0.66      Eos # 0.18      Baso # 0.06      Imm Grans # 0.09 (*)    BASIC METABOLIC PANEL - Normal    Sodium 138      Potassium 4.0      Chloride 103      CO2 27      Glucose 107      BUN 13      Creatinine 0.9      Calcium 9.0      Anion Gap 8      eGFR >60     CULTURE, AEROBIC  (SPECIFY SOURCE)   CULTURE, ANAEROBIC   CBC W/ AUTO DIFFERENTIAL    Narrative:     The following orders were created for panel order CBC auto differential.  Procedure                               Abnormality         Status                     ---------                               -----------         ------                     CBC with Differential[[id number]]       Abnormal            Final result                 Please view results for these tests on the individual orders.          Imaging:   Independently interpreted the imaging ordered by myself see ED course  Imaging Results               CT Soft Tissue Neck With Contrast (Final result)  Result time 07/03/25 11:06:56   Procedure changed from CT Soft Tissue Neck WO Contrast     Final result by Boone Prescott MD (07/03/25 11:06:56)                   Impression:      At least 3 peripherally enhancing centrally nonenhancing foci largest in the left supraclavicular area but also noted along the left skull base and left angle of the mandible possibly abscesses or necrotizing lymph nodes.  Correlation to history.  Further evaluation as needed.    This report was flagged in Epic as abnormal.      Electronically signed by: Boone  Kenyon  Date:    07/03/2025  Time:    11:06               Narrative:    EXAMINATION:  CT SOFT TISSUE NECK WITH CONTRAST    CLINICAL HISTORY:  Neck mass, nonpulsatile;    TECHNIQUE:  Axial CT images obtained throughout the region of the neck after the administration of intravenous contrast. Axial, sagittal and coronal reconstructions were performed.  Images acquired after the administration of 75 mL Omnipaque 350 IV con    COMPARISON:  No priors.    FINDINGS:  There is a large necrotic appearing lesion presumed abscess on the left about the area of interest markers measuring 3.3 x 2.6 x 3.4 cm.  Necrotic lymph node less favored but not excluded.  This does appear to extend towards the left paraspinal musculature posteriorly such as on series 2, image 114.    Additionally along the left angle of the mandible there is some heterogeneous hypoenhancing material with peripheral enhancement possibly early phlegmon on the order of 1.8 by 1.1 cm.    Additional area of concern for abscess or necrotic lymph node along the left posterior scalp on the order of 1.6 x 1.3 cm as on series 2, image 51    Apical mild paraseptal emphysema in the lungs    Otherwise the soft tissues of the nasopharynx, oropharynx, hypopharynx and larynx are within normal limits. The parotid, submandibular, and thyroid glands demonstrate nothing unusual.    Major vessels of the neck appear patent. Limited intracranial evaluation is within normal limits.  The visualized paranasal sinuses and mastoid air cells are essentially clear.    There are osseous degenerative changes, but no lytic or blastic lesions are evident.                                         Medications Given:     Medications   iohexoL (OMNIPAQUE 350) injection 75 mL (75 mLs Intravenous Given 7/3/25 1006)   LIDOcaine-prilocaine cream ( Topical (Top) Given 7/3/25 1203)        Medical Decision Making:    Additional Consideration:   Additional testing considered during clinical course:  considered including      Social determinants of health considered during development of treatment plan include:   Former Smoking/tobacco use -  Smoking was the leading social determinant of health affecting mortality and life expectancy, although income was another strong SDOH predictor, according to researchers from the [facility] Population [facility] at [facility] and the Department of Sociology at [facility]. EVELYNE Netw Open. 2022;5(4):o481151. doi:10.1001/jamanetworkopen.2022.6547     Current co-morbidities considered which impacted clinical decision making: chronic knee and back pain.      ED Course as of 07/03/25 1417   Thu Jul 03, 2025   1417 I independently interpreted all labs, imaging studies or EKGs that follow: [JA]   1417 CBC with no sign of leukocytosis.  BNP unremarkable, cultures of purulent material pending [JA]   1417 CT soft tissue neck shows signs of abscess [JA]      ED Course User Index  [JA] Marlene Stanley MD         Medical Decision Making  Problems Addressed:  Abscess: complicated acute illness or injury with systemic symptoms    Amount and/or Complexity of Data Reviewed  External Data Reviewed: notes.  Labs: ordered. Decision-making details documented in ED Course.  Radiology: ordered and independent interpretation performed. Decision-making details documented in ED Course.    Risk  Prescription drug management.          Perry Sampson presents to the ED tonight with abscess.  Discussed with him that best course would be to perform an incision and drainage.  The patient did not care for incision and drainage here in this emergency department he preferred aspiration with a needle.  Discussed risks and benefits of aspiration versus incision.  Patient understands, he prefers for aspiration today.  He allowed me to aspirate the mass at the left supraclavicular area.  5 cc of purulent material expressed, patient felt much improved following this aspiration.  He is not care to have  the small mass at the neck aspirated.  Will place the patient on Bactrim I 2 tablets b.i.d. and have him follow up with his PCP in 2 days for recheck.  I did discuss with the patient that should the abscess get larger, he have worsening pain, fevers that he will need to have it opened and drained.  Patient understands.  Patient hopes that the aspiration antibiotics will improve the infection.  He will follow up with his PCP in 2 days.      Abscess Aspiration    Date/Time: 7/3/2025 2:19 PM    Performed by: Marlene Stanley MD  Authorized by: Marlene Stanley MD    A time out verifies correct patient, procedure, equipment, support staff and site/side marked as required:   Anesthesia:     Local anesthetic:  Topical anesthetic  Procedure Details:     Site prepped with:  Mike Delgadillo    Location of Abscess #1:  Left supraclavicular    Size of needle #1:  18    Aspirated amount #1 (mL):  5  Material send for:  Aerobic Culture and Anaerobic Culture  Post-procedure:     Patient tolerance:  Patient tolerated the procedure well with no immediate complications           Clinical Impression:       ICD-10-CM ICD-9-CM   1. Abscess  L02.91 682.9       Discharge Medications:  Discharge Medication List as of 7/3/2025 12:52 PM        START taking these medications    Details   sulfamethoxazole-trimethoprim 800-160mg (BACTRIM DS) 800-160 mg Tab Take 2 tablets by mouth 2 (two) times daily. for 7 days, Starting Thu 7/3/2025, Until Thu 7/10/2025, Normal               Follow-up Information       Follow up With Specialties Details Why Contact Info    Melissa White MD Family Medicine Schedule an appointment as soon as possible for a visit in 2 days For wound re-check [address]  [address]  [place] [address]  [phone]               ED Disposition Condition    Discharge Stable             Marlene Stanley MD  07/03/25 6207

## 2025-07-07 LAB — BACTERIA SPEC AEROBE CULT: NO GROWTH

## 2025-07-11 LAB — BACTERIA SPEC ANAEROBE CULT: NORMAL

## 2025-07-18 ENCOUNTER — TELEPHONE (OUTPATIENT)
Dept: FAMILY MEDICINE | Facility: HOSPITAL | Age: 65
End: 2025-07-18
Payer: MEDICARE

## 2025-07-18 NOTE — TELEPHONE ENCOUNTER
Returned patient phone call no answer unable to leave a voicemail.    Copied from CRM #[id number]. Topic: Appointments - Appointment Access  >> Jul 18, 2025  9:10 AM Brayan wrote:    Type:  Sooner Appointment Request    Caller is requesting a sooner appointment.  Caller declined first available appointment listed below.  Caller will not accept being placed on the waitlist and is requesting a message be sent to doctor.  Name of Caller: pt   When is the first available appointment? N/a   Symptoms:follow up on bite   Would the patient rather a call back or a response via [facility]? Call   Best Call Back Number:   Additional Information:  
vent support  as per son, pt was admitted s/p for cervical fracture and had mucous plug with cardiac arrest   trach and peg placed at that time
restart home simvastatin  -lipid panel

## 2025-07-21 ENCOUNTER — TELEPHONE (OUTPATIENT)
Dept: FAMILY MEDICINE | Facility: HOSPITAL | Age: 65
End: 2025-07-21
Payer: MEDICARE

## 2025-07-21 NOTE — TELEPHONE ENCOUNTER
Returned call patient was successfully scheduled.      Copied from CRM #[id number]. Topic: General Inquiry - Return Call  >> Jul 18, 2025 10:10 AM Kami wrote:  Type:  Patient Returning Call    Who Called: Pt   Who Left Message for Patient: Xuan   Does the patient know what this is regarding?: returning missed call   Would the patient rather a call back or a response via [email or web address]? Call   Best Call Back Number:[phone]   Additional Information:  >> Jul 21, 2025  8:13 AM Med Assistant Yris wrote:  Anton Grey,Do you know why this patient called? I see you left a message.Thank you, Yris CARPENTER  ----- Message -----  From: Kami Mo  Sent: 7/18/2025  10:11 AM CDT  To: Christopher Torres Staff

## 2025-07-30 DIAGNOSIS — N52.9 ERECTILE DYSFUNCTION, UNSPECIFIED ERECTILE DYSFUNCTION TYPE: ICD-10-CM

## 2025-07-31 RX ORDER — SILDENAFIL 50 MG/1
50 TABLET, FILM COATED ORAL DAILY PRN
Qty: 30 TABLET | Refills: 2 | Status: SHIPPED | OUTPATIENT
Start: 2025-07-31 | End: 2025-10-29

## 2025-08-08 ENCOUNTER — PROCEDURE VISIT (OUTPATIENT)
Dept: FAMILY MEDICINE | Facility: HOSPITAL | Age: 65
End: 2025-08-08
Payer: MEDICARE

## 2025-08-08 VITALS — SYSTOLIC BLOOD PRESSURE: 107 MMHG | DIASTOLIC BLOOD PRESSURE: 72 MMHG | OXYGEN SATURATION: 100 % | HEART RATE: 70 BPM

## 2025-08-08 DIAGNOSIS — M21.619 BUNION: ICD-10-CM

## 2025-08-08 DIAGNOSIS — L02.91 ABSCESS: Primary | ICD-10-CM

## 2025-08-08 DIAGNOSIS — R29.898 LEFT LEG WEAKNESS: ICD-10-CM

## 2025-08-08 RX ORDER — SULFAMETHOXAZOLE AND TRIMETHOPRIM 800; 160 MG/1; MG/1
1 TABLET ORAL 2 TIMES DAILY
Qty: 14 TABLET | Refills: 0 | Status: SHIPPED | OUTPATIENT
Start: 2025-08-08 | End: 2025-08-15

## 2025-08-08 NOTE — PROGRESS NOTES
"Clinic Note  [facility]    Subjective:     Perry Sampson is a 64 y.o. year old who presents to clinic today for abscess follow up.    Neck & Scalp abscess:  - PCP Dr. Sandoval 6/9/25: Presented with isolated scalp bump, believes he was bit by something. Given Bactrim.  - ER 6/22/25: Abx helped but bump returned. Developed cervical + supraclavicular adenopathy. Given Doxycycline.  - ER 7/3/25: Declined drainage. Patient scared of needles & with lots of patient education, agreed to undergo needle aspiration of left supraclavicular area. Discharged with Bactrim.  - CT w/ contrast 7/3/25: At least 3 peripherally enhancing centrally nonenhancing foci largest in the left supraclavicular area but also noted along the left skull base and left angle of the mandible possibly abscesses or necrotizing lymph nodes.  - 8/8/25: Reports abscess has almost gone down but knots still present in initial area of infection & lymph node. Denies fevers/chills.    Left leg weakness:  - Reports that when he was 48, tore a "nerve" because he felt something snap. He was reportedly evaluated for a stroke & was told he had a TIA. He reports never being evaluated by ortho or receiving PT.    Review of Systems negative except for symptoms mentioned in HPI  Health Maintenance         Date Due Completion Date    TETANUS VACCINE Never done ---    Shingles Vaccine (1 of 2) Never done ---    Pneumococcal Vaccines (Age 50+) (1 of 1 - PCV) Never done ---    Abdominal Aortic Aneurysm Screening Never done ---    COVID-19 Vaccine (1 - 2024-25 season) Never done ---    Influenza Vaccine (1) 09/01/2025 ---    Colorectal Cancer Screening 02/23/2027 2/23/2024    Lipid Panel 02/08/2029 2/8/2024    RSV Vaccine (Age 60+ and Pregnant patients) (1 - 1-dose 75+ series) 10/16/2033 ---            No past medical history on file.    Past Surgical History:   Procedure Laterality Date    MANDIBLE FRACTURE SURGERY         No family history on file.    Social " History     Socioeconomic History    Marital status: Single   Tobacco Use    Smoking status: Former     Current packs/day: 0.00     Types: Cigarettes     Quit date: 7/15/2022     Years since quitting: 3.0    Smokeless tobacco: Never    Tobacco comments:     Daily marijuana for pain   Substance and Sexual Activity    Alcohol use: No    Drug use: No     Social Drivers of Health     Financial Resource Strain: Low Risk  (6/9/2025)    Overall Financial Resource Strain (CARDIA)     Difficulty of Paying Living Expenses: Not hard at all   Food Insecurity: No Food Insecurity (6/9/2025)    Hunger Vital Sign     Worried About Running Out of Food in the Last Year: Never true     Ran Out of Food in the Last Year: Never true   Transportation Needs: No Transportation Needs (6/9/2025)    PRAPARE - Transportation     Lack of Transportation (Medical): No     Lack of Transportation (Non-Medical): No   Physical Activity: Sufficiently Active (6/9/2025)    Exercise Vital Sign     Days of Exercise per Week: 7 days     Minutes of Exercise per Session: 30 min   Stress: No Stress Concern Present (6/9/2025)    [employer] - Occupational Stress Questionnaire     Feeling of Stress : Not at all   Housing Stability: Low Risk  (6/9/2025)    Housing Stability Vital Sign     Unable to Pay for Housing in the Last Year: No     Number of Times Moved in the Last Year: 1     Homeless in the Last Year: No       Current Medications[1]    Review of patient's allergies indicates:   Allergen Reactions    Pineapple Hives and Itching    Tomato (solanum lycopersicum) Swelling         Objective:     Vitals:    08/08/25 0857   BP: 107/72   Pulse: 70       Wt Readings from Last 1 Encounters:   07/03/25 61.2 kg (135 lb)       Physical Exam  Constitutional:       General: He is not in acute distress.     Comments: Ambulates with motor powered scooter   Eyes:      General:         Right eye: No discharge.         Left eye: No discharge.       Conjunctiva/sclera: Conjunctivae normal.   Pulmonary:      Effort: Pulmonary effort is normal. No respiratory distress.   Skin:     Comments: Slightly hardened mass in the left supraclavicular region & enlarged anterior cervical lymph node  Indented area (likely area of needle aspiration) is healed, without discharge   Neurological:      Mental Status: He is alert and oriented to person, place, and time.      Gait: Gait normal.   Psychiatric:         Mood and Affect: Mood normal.         Behavior: Behavior normal.         Assessment/Plan:      was seen today for procedure.    Diagnoses and all orders for this visit:    Abscess  Chronic condition. Controlled. Emphasized that I could not keep giving antibiotics and if it flared again, it would need to be drained.  -     sulfamethoxazole-trimethoprim 800-160mg (BACTRIM DS) 800-160 mg Tab; Take 1 tablet by mouth 2 (two) times daily. for 7 days    Bunion  Chronic condition. Patient desires orthopedics. Referral placed.  -     Ambulatory referral/consult to Podiatry; Future    Left leg weakness  Patient agreeable to trying PT. Referral placed.  -     Ambulatory Referral/Consult to Physical Therapy          Follow up in about 1 week for abscess follow up.    Case discussed with staff: Dr. Agnieszka White MD PGY-3  [facility]          [1]   Current Outpatient Medications   Medication Sig Dispense Refill    atorvastatin (LIPITOR) 40 MG tablet Take 1 tablet (40 mg total) by mouth once daily. 90 tablet 3    diclofenac sodium (VOLTAREN) 1 % Gel Apply 2 g topically 2 (two) times daily as needed. 450 g 2    LIDOcaine (LIDODERM) 5 % Place 2 patches onto the skin once daily. Remove & Discard patch within 12 hours or as directed by MD 30 patch 11    sildenafiL (VIAGRA) 50 MG tablet Take 1 tablet (50 mg total) by mouth daily as needed for Erectile Dysfunction. 30 tablet 2    aspirin (ECOTRIN) 81 MG EC tablet Take 1 tablet (81 mg total) by mouth once daily. 90  tablet 3    sulfamethoxazole-trimethoprim 800-160mg (BACTRIM DS) 800-160 mg Tab Take 1 tablet by mouth 2 (two) times daily. for 7 days 14 tablet 0     No current facility-administered medications for this visit.

## 2025-08-15 ENCOUNTER — OFFICE VISIT (OUTPATIENT)
Dept: FAMILY MEDICINE | Facility: HOSPITAL | Age: 65
End: 2025-08-15
Payer: MEDICARE

## 2025-08-15 VITALS
OXYGEN SATURATION: 98 % | HEIGHT: 66 IN | WEIGHT: 116.19 LBS | HEART RATE: 69 BPM | SYSTOLIC BLOOD PRESSURE: 99 MMHG | BODY MASS INDEX: 18.67 KG/M2 | DIASTOLIC BLOOD PRESSURE: 64 MMHG

## 2025-08-15 DIAGNOSIS — L02.91 ABSCESS: Primary | ICD-10-CM

## 2025-08-15 DIAGNOSIS — Z13.6 ENCOUNTER FOR ABDOMINAL AORTIC ANEURYSM (AAA) SCREENING: ICD-10-CM

## 2025-08-15 PROCEDURE — 99213 OFFICE O/P EST LOW 20 MIN: CPT

## 2025-08-22 ENCOUNTER — HOSPITAL ENCOUNTER (OUTPATIENT)
Dept: RADIOLOGY | Facility: HOSPITAL | Age: 65
Discharge: HOME | End: 2025-08-22
Payer: MEDICARE

## 2025-08-22 DIAGNOSIS — Z13.6 ENCOUNTER FOR ABDOMINAL AORTIC ANEURYSM (AAA) SCREENING: ICD-10-CM

## 2025-08-22 PROCEDURE — 76775 US EXAM ABDO BACK WALL LIM: CPT | Mod: 26,,, | Performed by: RADIOLOGY

## 2025-08-22 PROCEDURE — 76775 US EXAM ABDO BACK WALL LIM: CPT | Mod: TC,PN
